# Patient Record
Sex: FEMALE | Race: WHITE | Employment: UNEMPLOYED | URBAN - NONMETROPOLITAN AREA
[De-identification: names, ages, dates, MRNs, and addresses within clinical notes are randomized per-mention and may not be internally consistent; named-entity substitution may affect disease eponyms.]

---

## 2017-12-06 ENCOUNTER — OFFICE VISIT (OUTPATIENT)
Dept: FAMILY MEDICINE CLINIC | Age: 77
End: 2017-12-06
Payer: MEDICARE

## 2017-12-06 VITALS
SYSTOLIC BLOOD PRESSURE: 116 MMHG | HEART RATE: 73 BPM | OXYGEN SATURATION: 97 % | BODY MASS INDEX: 32.83 KG/M2 | HEIGHT: 67 IN | WEIGHT: 209.2 LBS | RESPIRATION RATE: 18 BRPM | DIASTOLIC BLOOD PRESSURE: 60 MMHG

## 2017-12-06 DIAGNOSIS — G47.00 INSOMNIA, UNSPECIFIED TYPE: ICD-10-CM

## 2017-12-06 DIAGNOSIS — G89.29 CHRONIC LEFT SHOULDER PAIN: Primary | ICD-10-CM

## 2017-12-06 DIAGNOSIS — M25.512 CHRONIC LEFT SHOULDER PAIN: Primary | ICD-10-CM

## 2017-12-06 DIAGNOSIS — M19.012 ARTHRITIS OF SHOULDER REGION, LEFT: ICD-10-CM

## 2017-12-06 DIAGNOSIS — E11.9 CONTROLLED TYPE 2 DIABETES MELLITUS WITHOUT COMPLICATION, WITHOUT LONG-TERM CURRENT USE OF INSULIN (HCC): ICD-10-CM

## 2017-12-06 DIAGNOSIS — I10 ESSENTIAL HYPERTENSION: ICD-10-CM

## 2017-12-06 DIAGNOSIS — Z91.81 AT RISK FOR FALLS: ICD-10-CM

## 2017-12-06 DIAGNOSIS — R53.81 PHYSICAL DECONDITIONING: ICD-10-CM

## 2017-12-06 DIAGNOSIS — Z78.0 POST-MENOPAUSAL: ICD-10-CM

## 2017-12-06 DIAGNOSIS — K21.9 GASTROESOPHAGEAL REFLUX DISEASE WITHOUT ESOPHAGITIS: ICD-10-CM

## 2017-12-06 PROCEDURE — G8484 FLU IMMUNIZE NO ADMIN: HCPCS | Performed by: NURSE PRACTITIONER

## 2017-12-06 PROCEDURE — G8400 PT W/DXA NO RESULTS DOC: HCPCS | Performed by: NURSE PRACTITIONER

## 2017-12-06 PROCEDURE — G8427 DOCREV CUR MEDS BY ELIG CLIN: HCPCS | Performed by: NURSE PRACTITIONER

## 2017-12-06 PROCEDURE — 1123F ACP DISCUSS/DSCN MKR DOCD: CPT | Performed by: NURSE PRACTITIONER

## 2017-12-06 PROCEDURE — 1036F TOBACCO NON-USER: CPT | Performed by: NURSE PRACTITIONER

## 2017-12-06 PROCEDURE — G8417 CALC BMI ABV UP PARAM F/U: HCPCS | Performed by: NURSE PRACTITIONER

## 2017-12-06 PROCEDURE — 1090F PRES/ABSN URINE INCON ASSESS: CPT | Performed by: NURSE PRACTITIONER

## 2017-12-06 PROCEDURE — 4040F PNEUMOC VAC/ADMIN/RCVD: CPT | Performed by: NURSE PRACTITIONER

## 2017-12-06 PROCEDURE — 99204 OFFICE O/P NEW MOD 45 MIN: CPT | Performed by: NURSE PRACTITIONER

## 2017-12-06 RX ORDER — PIOGLITAZONEHYDROCHLORIDE 15 MG/1
TABLET ORAL
Refills: 0 | Status: ON HOLD | COMMUNITY
Start: 2017-11-08 | End: 2017-12-21

## 2017-12-06 RX ORDER — QUETIAPINE FUMARATE 300 MG/1
TABLET, FILM COATED ORAL
Refills: 0 | Status: ON HOLD | COMMUNITY
Start: 2017-11-13 | End: 2017-12-21

## 2017-12-06 RX ORDER — OMEPRAZOLE 20 MG/1
CAPSULE, DELAYED RELEASE ORAL
Refills: 0 | Status: ON HOLD | COMMUNITY
Start: 2017-12-03 | End: 2017-12-21

## 2017-12-06 RX ORDER — METOPROLOL SUCCINATE 25 MG/1
TABLET, EXTENDED RELEASE ORAL
Refills: 0 | Status: ON HOLD | COMMUNITY
Start: 2017-11-01 | End: 2017-12-21

## 2017-12-06 RX ORDER — VENLAFAXINE HYDROCHLORIDE 225 MG/1
TABLET, EXTENDED RELEASE ORAL
Refills: 0 | Status: ON HOLD | COMMUNITY
Start: 2017-12-03 | End: 2017-12-21

## 2017-12-06 RX ORDER — HYDROXYUREA 500 MG/1
CAPSULE ORAL
Refills: 1 | Status: ON HOLD | COMMUNITY
Start: 2017-11-08 | End: 2017-12-21

## 2017-12-06 ASSESSMENT — PATIENT HEALTH QUESTIONNAIRE - PHQ9
1. LITTLE INTEREST OR PLEASURE IN DOING THINGS: 0
SUM OF ALL RESPONSES TO PHQ QUESTIONS 1-9: 0
SUM OF ALL RESPONSES TO PHQ9 QUESTIONS 1 & 2: 0
2. FEELING DOWN, DEPRESSED OR HOPELESS: 0

## 2017-12-06 NOTE — PROGRESS NOTES
Negative for congestion, ear pain and rhinorrhea. Eyes: Negative for discharge and visual disturbance. Respiratory: Negative for cough, chest tightness and shortness of breath. Cardiovascular: Negative for chest pain and palpitations. Gastrointestinal: Negative for abdominal pain, constipation, diarrhea and vomiting. Genitourinary: Negative for difficulty urinating and hematuria. Musculoskeletal: Positive for arthralgias (shoulder). Negative for myalgias. Skin: Negative for rash. Neurological: Negative for dizziness, weakness, numbness and headaches. Psychiatric/Behavioral: Positive for sleep disturbance. Negative for suicidal ideas. The patient is not nervous/anxious. Objective:     /60   Pulse 73   Resp 18   Ht 5' 7\" (1.702 m)   Wt 209 lb 3.2 oz (94.9 kg)   SpO2 97%   BMI 32.77 kg/m²     Physical Exam   Constitutional: She is oriented to person, place, and time. She appears well-developed and well-nourished. HENT:   Head: Normocephalic and atraumatic. Right Ear: External ear normal.   Left Ear: External ear normal.   Eyes: Conjunctivae and EOM are normal.   Neck: Trachea normal and normal range of motion. Neck supple. No spinous process tenderness present. No thyromegaly present. Cardiovascular: Normal rate, regular rhythm and normal heart sounds. Exam reveals no gallop and no friction rub. No murmur heard. Pulmonary/Chest: Effort normal and breath sounds normal.   Abdominal: Soft. Bowel sounds are normal. There is no tenderness. Musculoskeletal: Normal range of motion. Left shoulder: She exhibits tenderness, pain and spasm. She exhibits normal range of motion (pain with ROM), no effusion, normal pulse and normal strength. Neurological: She is alert and oriented to person, place, and time. Skin: Skin is warm and dry. No rash noted. No erythema. Psychiatric: She has a normal mood and affect.  Her speech is normal and behavior is normal. Thought content normal.   Vitals reviewed. Assessment/Plan:     Alyse was seen today for shoulder pain. Diagnoses and all orders for this visit:    Chronic left shoulder pain  -     [facility] Dexa Bone Density Scan; Future  -     XR SHOULDER LEFT (MIN 2 VIEWS); Future  -     External Referral To Home Health    Gastroesophageal reflux disease without esophagitis  -     External Referral To Home Health    Physical deconditioning  -     Cancel: Shira Mohan 54  -     External Referral To Home Health    Controlled type 2 diabetes mellitus without complication, without long-term current use of insulin ([facility] [address]  -     External Referral To Home Health    Arthritis of shoulder region, left  -     External Referral To Home Health    Post-menopausal  -     [facility] Dexa Bone Density Scan; Future    At risk for falls  -     External Referral To Home Health    Essential hypertension  -     External Referral To Home Health    Insomnia, unspecified type  -     External Referral To Home Health        Return in about 2 months (around 2/6/2018) for Medicare wellness, schedule for am.      Discussed use, benefit, and side effects of prescribed medications. Barriers to medication compliance addressed. All patient questions answered. Pt voiced understanding.      Electronically signed by Mone Villasenor CNP on 12/13/2017 at 12:23 PM

## 2017-12-11 ENCOUNTER — TELEPHONE (OUTPATIENT)
Dept: FAMILY MEDICINE CLINIC | Age: 77
End: 2017-12-11

## 2017-12-13 ASSESSMENT — ENCOUNTER SYMPTOMS
CHEST TIGHTNESS: 0
DIARRHEA: 0
VOMITING: 0
CONSTIPATION: 0
COUGH: 0
ABDOMINAL PAIN: 0
RHINORRHEA: 0
SHORTNESS OF BREATH: 0
EYE DISCHARGE: 0

## 2017-12-18 ENCOUNTER — HOSPITAL ENCOUNTER (INPATIENT)
Age: 77
LOS: 3 days | Discharge: NURSING, REHAB OR LONG-TERM CARE | DRG: 544 | End: 2017-12-21
Attending: EMERGENCY MEDICINE | Admitting: INTERNAL MEDICINE
Payer: MEDICARE

## 2017-12-18 ENCOUNTER — APPOINTMENT (OUTPATIENT)
Dept: CT IMAGING | Age: 77
DRG: 544 | End: 2017-12-18
Payer: MEDICARE

## 2017-12-18 DIAGNOSIS — D64.9 ANEMIA, UNSPECIFIED TYPE: ICD-10-CM

## 2017-12-18 DIAGNOSIS — M48.54XA COMPRESSION FRACTURE OF THORACIC SPINE, NON-TRAUMATIC, INITIAL ENCOUNTER (HCC): ICD-10-CM

## 2017-12-18 DIAGNOSIS — E86.0 DEHYDRATION: ICD-10-CM

## 2017-12-18 DIAGNOSIS — R53.1 GENERAL WEAKNESS: ICD-10-CM

## 2017-12-18 DIAGNOSIS — S22.080A T12 COMPRESSION FRACTURE (HCC): Primary | ICD-10-CM

## 2017-12-18 LAB
ABO: NORMAL
ALBUMIN SERPL-MCNC: 3.6 G/DL (ref 3.5–5.1)
ALP BLD-CCNC: 92 U/L (ref 38–126)
ALT SERPL-CCNC: 65 U/L (ref 11–66)
AMORPHOUS: ABNORMAL
AMPHETAMINE+METHAMPHETAMINE URINE SCREEN: NEGATIVE
ANION GAP SERPL CALCULATED.3IONS-SCNC: 16 MEQ/L (ref 8–16)
ANISOCYTOSIS: ABNORMAL
ANTIBODY SCREEN: NORMAL
APTT: 27.1 SECONDS (ref 22–38)
AST SERPL-CCNC: 43 U/L (ref 5–40)
BACTERIA: ABNORMAL /HPF
BARBITURATE QUANTITATIVE URINE: NEGATIVE
BASOPHILS # BLD: 0.5 %
BASOPHILS ABSOLUTE: 0 THOU/MM3 (ref 0–0.1)
BENZODIAZEPINE QUANTITATIVE URINE: NEGATIVE
BILIRUB SERPL-MCNC: 0.5 MG/DL (ref 0.3–1.2)
BILIRUBIN URINE: ABNORMAL
BLOOD, URINE: NEGATIVE
BUN BLDV-MCNC: 53 MG/DL (ref 7–22)
CALCIUM SERPL-MCNC: 9.1 MG/DL (ref 8.5–10.5)
CANNABINOID QUANTITATIVE URINE: NEGATIVE
CASTS 2: ABNORMAL /LPF
CASTS UA: ABNORMAL /LPF
CHARACTER, URINE: ABNORMAL
CHLORIDE BLD-SCNC: 97 MEQ/L (ref 98–111)
CO2: 25 MEQ/L (ref 23–33)
COCAINE METABOLITE QUANTITATIVE URINE: NEGATIVE
COLOR: YELLOW
CREAT SERPL-MCNC: 1.3 MG/DL (ref 0.4–1.2)
CRYSTALS, UA: ABNORMAL
EKG ATRIAL RATE: 84 BPM
EKG P AXIS: 73 DEGREES
EKG P-R INTERVAL: 198 MS
EKG Q-T INTERVAL: 384 MS
EKG QRS DURATION: 86 MS
EKG QTC CALCULATION (BAZETT): 453 MS
EKG R AXIS: 26 DEGREES
EKG T AXIS: 67 DEGREES
EKG VENTRICULAR RATE: 84 BPM
EOSINOPHIL # BLD: 0.3 %
EOSINOPHILS ABSOLUTE: 0 THOU/MM3 (ref 0–0.4)
EPITHELIAL CELLS, UA: ABNORMAL /HPF
FERRITIN: 1372 NG/ML (ref 10–291)
GFR SERPL CREATININE-BSD FRML MDRD: 39 ML/MIN/1.73M2
GLUCOSE BLD-MCNC: 108 MG/DL (ref 70–108)
GLUCOSE URINE: NEGATIVE MG/DL
HCT VFR BLD CALC: 27.6 % (ref 37–47)
HEMOCCULT STL QL: NEGATIVE
HEMOGLOBIN: 9 GM/DL (ref 12–16)
ICTOTEST: NEGATIVE
INR BLD: 1.07 (ref 0.85–1.13)
IRON: 61 UG/DL (ref 50–170)
KETONES, URINE: ABNORMAL
LACTIC ACID: 1.2 MMOL/L (ref 0.5–2.2)
LEUKOCYTE ESTERASE, URINE: NEGATIVE
LIPASE: 41.1 U/L (ref 5.6–51.3)
LYMPHOCYTES # BLD: 13.3 %
LYMPHOCYTES ABSOLUTE: 0.5 THOU/MM3 (ref 1–4.8)
MACROCYTES: ABNORMAL
MCH RBC QN AUTO: 45.8 PG (ref 27–31)
MCHC RBC AUTO-ENTMCNC: 32.6 GM/DL (ref 33–37)
MCV RBC AUTO: 140.3 FL (ref 81–99)
MISCELLANEOUS LAB TEST RESULT: ABNORMAL
MONOCYTES # BLD: 11.2 %
MONOCYTES ABSOLUTE: 0.4 THOU/MM3 (ref 0.4–1.3)
NITRITE, URINE: NEGATIVE
NUCLEATED RED BLOOD CELLS: 0 /100 WBC
OPIATES, URINE: NEGATIVE
OSMOLALITY CALCULATION: 290.6 MOSMOL/KG (ref 275–300)
OXYCODONE: NEGATIVE
PATHOLOGIST REVIEW: ABNORMAL
PDW BLD-RTO: 21.1 % (ref 11.5–14.5)
PH UA: 5
PHENCYCLIDINE QUANTITATIVE URINE: POSITIVE
PLATELET # BLD: 698 THOU/MM3 (ref 130–400)
PLATELET ESTIMATE: ABNORMAL
PMV BLD AUTO: 7.7 MCM (ref 7.4–10.4)
POTASSIUM SERPL-SCNC: 3.5 MEQ/L (ref 3.5–5.2)
PRO-BNP: 378.1 PG/ML (ref 0–1800)
PROTEIN UA: 30
RBC # BLD: 1.96 MILL/MM3 (ref 4.2–5.4)
RBC URINE: ABNORMAL /HPF
RENAL EPITHELIAL, UA: ABNORMAL
RH FACTOR: NORMAL
SCAN OF BLOOD SMEAR: NORMAL
SEG NEUTROPHILS: 74.7 %
SEGMENTED NEUTROPHILS ABSOLUTE COUNT: 2.6 THOU/MM3 (ref 1.8–7.7)
SODIUM BLD-SCNC: 138 MEQ/L (ref 135–145)
SPECIFIC GRAVITY, URINE: >= 1.03 (ref 1–1.03)
TOTAL CK: 83 U/L (ref 30–135)
TOTAL IRON BINDING CAPACITY: 179 UG/DL (ref 171–450)
TOTAL PROTEIN: 6.6 G/DL (ref 6.1–8)
TROPONIN T: < 0.01 NG/ML
UROBILINOGEN, URINE: 1 EU/DL
WBC # BLD: 3.5 THOU/MM3 (ref 4.8–10.8)
WBC UA: ABNORMAL /HPF
YEAST: ABNORMAL

## 2017-12-18 PROCEDURE — 6360000002 HC RX W HCPCS: Performed by: EMERGENCY MEDICINE

## 2017-12-18 PROCEDURE — 6360000002 HC RX W HCPCS: Performed by: INTERNAL MEDICINE

## 2017-12-18 PROCEDURE — 82728 ASSAY OF FERRITIN: CPT

## 2017-12-18 PROCEDURE — 85025 COMPLETE CBC W/AUTO DIFF WBC: CPT

## 2017-12-18 PROCEDURE — 99239 HOSP IP/OBS DSCHRG MGMT >30: CPT | Performed by: INTERNAL MEDICINE

## 2017-12-18 PROCEDURE — 81001 URINALYSIS AUTO W/SCOPE: CPT

## 2017-12-18 PROCEDURE — 83880 ASSAY OF NATRIURETIC PEPTIDE: CPT

## 2017-12-18 PROCEDURE — 99285 EMERGENCY DEPT VISIT HI MDM: CPT

## 2017-12-18 PROCEDURE — 85610 PROTHROMBIN TIME: CPT

## 2017-12-18 PROCEDURE — 96374 THER/PROPH/DIAG INJ IV PUSH: CPT

## 2017-12-18 PROCEDURE — 80307 DRUG TEST PRSMV CHEM ANLYZR: CPT

## 2017-12-18 PROCEDURE — 83605 ASSAY OF LACTIC ACID: CPT

## 2017-12-18 PROCEDURE — 83550 IRON BINDING TEST: CPT

## 2017-12-18 PROCEDURE — 93005 ELECTROCARDIOGRAM TRACING: CPT

## 2017-12-18 PROCEDURE — 82550 ASSAY OF CK (CPK): CPT

## 2017-12-18 PROCEDURE — 85730 THROMBOPLASTIN TIME PARTIAL: CPT

## 2017-12-18 PROCEDURE — 36415 COLL VENOUS BLD VENIPUNCTURE: CPT

## 2017-12-18 PROCEDURE — 83690 ASSAY OF LIPASE: CPT

## 2017-12-18 PROCEDURE — 1200000000 HC SEMI PRIVATE

## 2017-12-18 PROCEDURE — 96361 HYDRATE IV INFUSION ADD-ON: CPT

## 2017-12-18 PROCEDURE — 2580000003 HC RX 258: Performed by: EMERGENCY MEDICINE

## 2017-12-18 PROCEDURE — 74176 CT ABD & PELVIS W/O CONTRAST: CPT

## 2017-12-18 PROCEDURE — 6370000000 HC RX 637 (ALT 250 FOR IP): Performed by: INTERNAL MEDICINE

## 2017-12-18 PROCEDURE — 82272 OCCULT BLD FECES 1-3 TESTS: CPT

## 2017-12-18 PROCEDURE — 86901 BLOOD TYPING SEROLOGIC RH(D): CPT

## 2017-12-18 PROCEDURE — 80053 COMPREHEN METABOLIC PANEL: CPT

## 2017-12-18 PROCEDURE — 83540 ASSAY OF IRON: CPT

## 2017-12-18 PROCEDURE — 2580000003 HC RX 258: Performed by: INTERNAL MEDICINE

## 2017-12-18 PROCEDURE — 86850 RBC ANTIBODY SCREEN: CPT

## 2017-12-18 PROCEDURE — 86900 BLOOD TYPING SEROLOGIC ABO: CPT

## 2017-12-18 PROCEDURE — 84484 ASSAY OF TROPONIN QUANT: CPT

## 2017-12-18 PROCEDURE — 76376 3D RENDER W/INTRP POSTPROCES: CPT

## 2017-12-18 RX ORDER — OXYCODONE HYDROCHLORIDE AND ACETAMINOPHEN 5; 325 MG/1; MG/1
1 TABLET ORAL EVERY 4 HOURS PRN
Status: DISCONTINUED | OUTPATIENT
Start: 2017-12-18 | End: 2017-12-21 | Stop reason: HOSPADM

## 2017-12-18 RX ORDER — ACETAMINOPHEN 325 MG/1
650 TABLET ORAL EVERY 4 HOURS PRN
Status: DISCONTINUED | OUTPATIENT
Start: 2017-12-18 | End: 2017-12-21 | Stop reason: HOSPADM

## 2017-12-18 RX ORDER — SODIUM CHLORIDE 9 MG/ML
INJECTION, SOLUTION INTRAVENOUS CONTINUOUS
Status: DISCONTINUED | OUTPATIENT
Start: 2017-12-18 | End: 2017-12-19

## 2017-12-18 RX ORDER — SODIUM CHLORIDE 0.9 % (FLUSH) 0.9 %
10 SYRINGE (ML) INJECTION EVERY 12 HOURS SCHEDULED
Status: DISCONTINUED | OUTPATIENT
Start: 2017-12-18 | End: 2017-12-21 | Stop reason: HOSPADM

## 2017-12-18 RX ORDER — HYDROXYUREA 500 MG/1
500 CAPSULE ORAL 3 TIMES DAILY
Status: DISCONTINUED | OUTPATIENT
Start: 2017-12-18 | End: 2017-12-21 | Stop reason: HOSPADM

## 2017-12-18 RX ORDER — LIDOCAINE 50 MG/G
1 PATCH TOPICAL DAILY
Status: DISCONTINUED | OUTPATIENT
Start: 2017-12-18 | End: 2017-12-21 | Stop reason: HOSPADM

## 2017-12-18 RX ORDER — FERROUS SULFATE 325(65) MG
325 TABLET ORAL
Status: DISCONTINUED | OUTPATIENT
Start: 2017-12-18 | End: 2017-12-21 | Stop reason: HOSPADM

## 2017-12-18 RX ORDER — POLYETHYLENE GLYCOL 3350, SODIUM CHLORIDE, SODIUM BICARBONATE, POTASSIUM CHLORIDE 420; 11.2; 5.72; 1.48 G/4L; G/4L; G/4L; G/4L
4000 POWDER, FOR SOLUTION ORAL ONCE
Status: DISCONTINUED | OUTPATIENT
Start: 2017-12-18 | End: 2017-12-21 | Stop reason: HOSPADM

## 2017-12-18 RX ORDER — VENLAFAXINE HYDROCHLORIDE 75 MG/1
225 CAPSULE, EXTENDED RELEASE ORAL
Status: DISCONTINUED | OUTPATIENT
Start: 2017-12-18 | End: 2017-12-21 | Stop reason: HOSPADM

## 2017-12-18 RX ORDER — ATORVASTATIN CALCIUM 20 MG/1
20 TABLET, FILM COATED ORAL DAILY
Status: DISCONTINUED | OUTPATIENT
Start: 2017-12-18 | End: 2017-12-21 | Stop reason: HOSPADM

## 2017-12-18 RX ORDER — FAMOTIDINE 20 MG/1
20 TABLET, FILM COATED ORAL 2 TIMES DAILY
Status: DISCONTINUED | OUTPATIENT
Start: 2017-12-18 | End: 2017-12-18 | Stop reason: DRUGHIGH

## 2017-12-18 RX ORDER — HYDROXYUREA 500 MG/1
500 CAPSULE ORAL DAILY
Status: DISCONTINUED | OUTPATIENT
Start: 2017-12-18 | End: 2017-12-18 | Stop reason: DRUGHIGH

## 2017-12-18 RX ORDER — CYANOCOBALAMIN 1000 UG/ML
1000 INJECTION INTRAMUSCULAR; SUBCUTANEOUS
Status: DISCONTINUED | OUTPATIENT
Start: 2018-01-12 | End: 2017-12-21 | Stop reason: HOSPADM

## 2017-12-18 RX ORDER — FAMOTIDINE 20 MG/1
20 TABLET, FILM COATED ORAL DAILY
Status: DISCONTINUED | OUTPATIENT
Start: 2017-12-18 | End: 2017-12-21 | Stop reason: HOSPADM

## 2017-12-18 RX ORDER — SODIUM CHLORIDE 0.9 % (FLUSH) 0.9 %
10 SYRINGE (ML) INJECTION PRN
Status: DISCONTINUED | OUTPATIENT
Start: 2017-12-18 | End: 2017-12-21 | Stop reason: HOSPADM

## 2017-12-18 RX ORDER — ACETAMINOPHEN 500 MG
1000 TABLET ORAL EVERY 6 HOURS PRN
Status: DISCONTINUED | OUTPATIENT
Start: 2017-12-18 | End: 2017-12-21 | Stop reason: HOSPADM

## 2017-12-18 RX ORDER — VALSARTAN 160 MG/1
160 TABLET ORAL DAILY
Status: DISCONTINUED | OUTPATIENT
Start: 2017-12-18 | End: 2017-12-21 | Stop reason: HOSPADM

## 2017-12-18 RX ORDER — POTASSIUM CHLORIDE 20MEQ/15ML
40 LIQUID (ML) ORAL PRN
Status: DISCONTINUED | OUTPATIENT
Start: 2017-12-18 | End: 2017-12-21 | Stop reason: HOSPADM

## 2017-12-18 RX ORDER — POTASSIUM CHLORIDE 7.45 MG/ML
10 INJECTION INTRAVENOUS PRN
Status: DISCONTINUED | OUTPATIENT
Start: 2017-12-18 | End: 2017-12-21 | Stop reason: HOSPADM

## 2017-12-18 RX ORDER — MORPHINE SULFATE 2 MG/ML
2 INJECTION, SOLUTION INTRAMUSCULAR; INTRAVENOUS ONCE
Status: COMPLETED | OUTPATIENT
Start: 2017-12-18 | End: 2017-12-18

## 2017-12-18 RX ORDER — PANTOPRAZOLE SODIUM 40 MG/1
40 TABLET, DELAYED RELEASE ORAL
Status: DISCONTINUED | OUTPATIENT
Start: 2017-12-19 | End: 2017-12-21 | Stop reason: HOSPADM

## 2017-12-18 RX ORDER — ONDANSETRON 2 MG/ML
4 INJECTION INTRAMUSCULAR; INTRAVENOUS EVERY 6 HOURS PRN
Status: DISCONTINUED | OUTPATIENT
Start: 2017-12-18 | End: 2017-12-21 | Stop reason: HOSPADM

## 2017-12-18 RX ORDER — ASPIRIN 81 MG/1
81 TABLET ORAL DAILY
Status: DISCONTINUED | OUTPATIENT
Start: 2017-12-18 | End: 2017-12-20

## 2017-12-18 RX ORDER — HYDROCHLOROTHIAZIDE 25 MG/1
25 TABLET ORAL DAILY
Status: DISCONTINUED | OUTPATIENT
Start: 2017-12-18 | End: 2017-12-21 | Stop reason: HOSPADM

## 2017-12-18 RX ORDER — POTASSIUM CHLORIDE 20 MEQ/1
40 TABLET, EXTENDED RELEASE ORAL PRN
Status: DISCONTINUED | OUTPATIENT
Start: 2017-12-18 | End: 2017-12-21 | Stop reason: HOSPADM

## 2017-12-18 RX ORDER — OXYBUTYNIN CHLORIDE 5 MG/1
5 TABLET, EXTENDED RELEASE ORAL DAILY
Status: DISCONTINUED | OUTPATIENT
Start: 2017-12-18 | End: 2017-12-21 | Stop reason: HOSPADM

## 2017-12-18 RX ORDER — PIOGLITAZONEHYDROCHLORIDE 15 MG/1
15 TABLET ORAL DAILY
Status: DISCONTINUED | OUTPATIENT
Start: 2017-12-18 | End: 2017-12-21 | Stop reason: HOSPADM

## 2017-12-18 RX ORDER — DEXTROSE AND SODIUM CHLORIDE 5; .33 G/100ML; G/100ML
INJECTION, SOLUTION INTRAVENOUS CONTINUOUS
Status: DISCONTINUED | OUTPATIENT
Start: 2017-12-18 | End: 2017-12-19

## 2017-12-18 RX ORDER — OXCARBAZEPINE 300 MG/1
300 TABLET, FILM COATED ORAL 2 TIMES DAILY
Status: DISCONTINUED | OUTPATIENT
Start: 2017-12-18 | End: 2017-12-21 | Stop reason: HOSPADM

## 2017-12-18 RX ORDER — METOPROLOL SUCCINATE 25 MG/1
25 TABLET, EXTENDED RELEASE ORAL DAILY
Status: DISCONTINUED | OUTPATIENT
Start: 2017-12-18 | End: 2017-12-21 | Stop reason: HOSPADM

## 2017-12-18 RX ORDER — QUETIAPINE FUMARATE 100 MG/1
300 TABLET, FILM COATED ORAL NIGHTLY
Status: DISCONTINUED | OUTPATIENT
Start: 2017-12-18 | End: 2017-12-21 | Stop reason: HOSPADM

## 2017-12-18 RX ADMIN — SODIUM CHLORIDE: 9 INJECTION, SOLUTION INTRAVENOUS at 15:38

## 2017-12-18 RX ADMIN — QUETIAPINE FUMARATE 300 MG: 100 TABLET ORAL at 20:35

## 2017-12-18 RX ADMIN — HYDROXYUREA 500 MG: 500 CAPSULE ORAL at 20:35

## 2017-12-18 RX ADMIN — OXYCODONE HYDROCHLORIDE AND ACETAMINOPHEN 1 TABLET: 5; 325 TABLET ORAL at 20:35

## 2017-12-18 RX ADMIN — ENOXAPARIN SODIUM 40 MG: 40 INJECTION SUBCUTANEOUS at 15:35

## 2017-12-18 RX ADMIN — OXYCODONE HYDROCHLORIDE AND ACETAMINOPHEN 1 TABLET: 5; 325 TABLET ORAL at 16:20

## 2017-12-18 RX ADMIN — ATORVASTATIN CALCIUM 20 MG: 20 TABLET, FILM COATED ORAL at 20:35

## 2017-12-18 RX ADMIN — MORPHINE SULFATE 2 MG: 2 INJECTION, SOLUTION INTRAMUSCULAR; INTRAVENOUS at 07:35

## 2017-12-18 RX ADMIN — OXCARBAZEPINE 300 MG: 300 TABLET ORAL at 20:35

## 2017-12-18 RX ADMIN — DEXTROSE AND SODIUM CHLORIDE: 5; 330 INJECTION, SOLUTION INTRAVENOUS at 07:36

## 2017-12-18 ASSESSMENT — PAIN DESCRIPTION - ORIENTATION
ORIENTATION: RIGHT
ORIENTATION: RIGHT
ORIENTATION: LOWER
ORIENTATION: LOWER;RIGHT

## 2017-12-18 ASSESSMENT — PAIN DESCRIPTION - PAIN TYPE
TYPE: ACUTE PAIN

## 2017-12-18 ASSESSMENT — PAIN SCALES - GENERAL
PAINLEVEL_OUTOF10: 5
PAINLEVEL_OUTOF10: 7
PAINLEVEL_OUTOF10: 6
PAINLEVEL_OUTOF10: 6
PAINLEVEL_OUTOF10: 7
PAINLEVEL_OUTOF10: 8
PAINLEVEL_OUTOF10: 7

## 2017-12-18 ASSESSMENT — PAIN DESCRIPTION - DESCRIPTORS
DESCRIPTORS: DISCOMFORT
DESCRIPTORS: DISCOMFORT
DESCRIPTORS: ACHING

## 2017-12-18 ASSESSMENT — PAIN DESCRIPTION - LOCATION
LOCATION: BACK
LOCATION: BACK;ABDOMEN
LOCATION: BACK
LOCATION: BACK

## 2017-12-18 ASSESSMENT — ENCOUNTER SYMPTOMS
EYE ITCHING: 0
EYE DISCHARGE: 0
DIARRHEA: 0
VOMITING: 0
SHORTNESS OF BREATH: 0
COUGH: 0
NAUSEA: 0
WHEEZING: 0
RHINORRHEA: 0
ABDOMINAL PAIN: 0
BACK PAIN: 1
ABDOMINAL DISTENTION: 0

## 2017-12-18 ASSESSMENT — PAIN DESCRIPTION - FREQUENCY
FREQUENCY: CONTINUOUS

## 2017-12-18 ASSESSMENT — PAIN DESCRIPTION - PROGRESSION
CLINICAL_PROGRESSION: NOT CHANGED
CLINICAL_PROGRESSION: GRADUALLY WORSENING

## 2017-12-18 ASSESSMENT — PAIN DESCRIPTION - ONSET
ONSET: ON-GOING
ONSET: ON-GOING

## 2017-12-18 NOTE — ED NOTES
Spoke with pts daughter and updated on admission plans. Pts daughter is en route to ED at this time.       Natasha Lane, [address] [facility]  12/18/17 8429

## 2017-12-18 NOTE — ED NOTES
Pt admitted to hospital. Pt to be transported to floor at 1010.         Severo Bickers List, LPN  43/39/34 [id number]

## 2017-12-18 NOTE — ED TRIAGE NOTES
Pt presents to ED with c/o lower back pain and weakness. Pt states she fell last week and was seen at [facility] and was discharged. Pt states since then she has been experiencing Rt lower back pain. Pt states pain is 8/10, continuous pain, that worsens with ambulation. Pt states since her fall she is experiencing generalized weakness. Dr Scar Zazueta at bedside. Pt states she has had a decreased appetite since the fall as well, which she believes is why she is weak.

## 2017-12-18 NOTE — CARE COORDINATION
12/18/17, 3:12 PM      Alyse Jimenez       Admitted from: ED 12/18/2017/ Timothyu 23 day: 0   Location: -/[facility] Reason for admit: Compression fracture of thoracic spine, non-traumatic, initial encounter [facility]) [M48.54XA]  Compression fracture of thoracic spine, non-traumatic, initial encounter [facility]) [I24.11FJ] Status: inpatient  Admit order signed?: yes  PMH:  has a past medical history of Arthritis; Cancer ([facility] [address] Diabetes mellitus ([facility] [address] Hyperlipidemia; Hypertension; and Psychiatric problem. Procedure: 12/18/17 Kyphoplasty planned for T12 compression fracture  Pertinent abnormal Imaging:t12 compression fx  Medications:  Scheduled Meds:   aspirin  81 mg Oral Daily    atorvastatin  20 mg Oral Daily    cyanocobalamin  1,000 mcg Intramuscular Q30 Days    ferrous sulfate  325 mg Oral TID WC    hydrochlorothiazide  25 mg Oral Daily    hydroxyurea  500 mg Oral Daily    metoprolol succinate  25 mg Oral Daily    mupirocin   Topical BID    [START ON 12/19/2017] pantoprazole  40 mg Oral QAM AC    OXcarbazepine  300 mg Oral BID    oxybutynin  5 mg Oral Daily    pioglitazone  15 mg Oral Daily    QUEtiapine  300 mg Oral Nightly    valsartan  160 mg Oral Daily    venlafaxine  225 mg Oral Daily with breakfast    sodium chloride flush  10 mL Intravenous 2 times per day    enoxaparin  40 mg Subcutaneous Q24H    polyethylene glycol-electrolytes  4,000 mL Oral Once    famotidine  20 mg Oral Daily     Continuous Infusions:   dextrose 5 % and 0.33 % NaCl 125 mL/hr at 12/18/17 0736    sodium chloride        Pertinent Info/Orders/Treatment Plan:  N/V checks. Pain management. Kyphoplasty planned  Diet: DIET LOW SODIUM 2 GM;   DVT Prophylaxis: Lovenox  Smoking status:  reports that she has never smoked.  She has never used smokeless tobacco.   Influenza Vaccination Screening Completed: yes  Pneumonia Vaccination Screening Completed: yes  Core measures: vte  PCP: Tyrel Escobar CNP  Readmission:   no  Risk Score: 9.5     Discharge Planning  Current Residence:  Private Residence  Living Arrangements:  Children   Support Systems:  Children  Current Services PTA:     Potential Assistance Needed:  Sarath Baca, Outpatient PT/OT  Potential Assistance Purchasing Medications:  No  Does patient want to participate in local refill/ meds to beds program?  No  Type of Home Care Services:  Nursing Services  Patient expects to be discharged to:  home vs Rehab  Expected Discharge date:  12/22/17  Follow Up Appointment: Best Day/ Time: Monday AM    Discharge Plan: Keith Bagley thinks she may need HH vs ECF. Jan Vuong evaluated and will follow progress to help determine appropriate next level of care.    Evaluation: yes

## 2017-12-18 NOTE — CARE COORDINATION
DISCHARGE BARRIERS  12/18/17, 1:27 PM    Reason for Referral: ECF? Mental Status: Patient is alert and oriented  Decision Making: Patient is making her own decisions at this time  Family/Social/Home Environment: Assessment completed with Patient and daughter. Patient resides alone in low-income apartments with no stairs. Patient stated that she was independent and driving prior to falling. Patient stated that she did go to [facility] was discharged home and then started to not be able to walk and was in pain and dizzy when she would close her eyes. Patient stated that she knows she will more than likely have to go somewhere but does not know where at this time. All of her family resides in [place] and they are attempting to talk her into relocating. Current Services: none  Current Equipment: walker, cane  Payment Source: Medicare, Medicaid  Concerns or Barriers to Discharge: not at this time  Collabrative List of ECF/HH were provided: offered and declined. Teach Back Method used with Patient regarding care plan and discharge plan. Patient and daughter verbalize understanding of the plan of care and contribute to goal setting. Anticipated Needs/Discharge Plan: [facility] vs ECF in [place] vs home with home health.      Electronically signed by SLOANE Sutherland, JOSE E on 12/18/2017 at 1:27 PM

## 2017-12-18 NOTE — ED PROVIDER NOTES
Process   Final Result   AGE-INDETERMINATE, LIKELY ACUTE/SUBACUTE, COMPRESSION FRACTURE OF THE T12 VERTEBRAL BODY WITH RETROPULSED COMPONENTS EXTENDING INTO THE CANAL TO PRODUCE MILD/MODERATE CENTRAL CANAL STENOSIS. NO EVIDENCE OF UNSTABLE FRACTURE. CHARACTERISTICS    OF LIKELY PATHOLOGIC OSTEOPOROTIC COMPRESSION FRACTURE. CORRELATION WITH POINT TENDERNESS RECOMMENDED. POTENTIALLY THIS PATIENT COULD BENEFIT FROM VERTEBROPLASTY FOR PAIN MANAGEMENT. IF THIS IS A POSSIBLE TREATMENT OPTION, CONSULTATION WITH    INTERVENTIONAL RADIOLOGY SCHEDULING ([phone]) CAN ASSIST WITH THIS. MULTILEVEL DEGENERATIVE CHANGES RESULTING IN MULTIPLE AREAS OF LIKELY CLINICALLY SIGNIFICANT CANAL AND FORAMINAL STENOSIS, AS DISCUSSED. **This report has been created using voice recognition software. It may contain minor errors which are inherent in voice recognition technology. **            Final report electronically signed by Dr. Mejia Chavarria on 12/18/2017 8:54 AM      CT ABDOMEN PELVIS WO IV CONTRAST Additional Contrast? None   Final Result   AGE-INDETERMINATE COMPRESSION FRACTURE OF THE T12 VERTEBRAL BODY WITH RETROPULSED COMPONENTS PRODUCING MILD/MODERATE CENTRAL CANAL STENOSIS AT T12. POSSIBLE CONSTIPATION. NO OTHER EVIDENCE OF POSSIBLE ACUTE ABNORMALITY. INCIDENTAL FINDINGS AS DISCUSSED. **This report has been created using voice recognition software. It may contain minor errors which are inherent in voice recognition technology. **            Final report electronically signed by Dr. Mejia Chavarria on 12/18/2017 8:47 AM          [] Visualized and interpreted by me   [] Radiologist's Wet Read Report Reviewed   [] Discussed with Radiologist.    LABS:   Labs Reviewed   CBC WITH AUTO DIFFERENTIAL - Abnormal; Notable for the following:        Result Value    WBC 3.5 (*)     RBC 1.96 (*)     Hemoglobin 9.0 (*)     Hematocrit 27.6 (*)     .3 (*)     MCH 45.8 (*)     MCHC 32.6 (*) RDW 21.1 (*)     Platelets 172 (*)     All other components within normal limits   COMPREHENSIVE METABOLIC PANEL - Abnormal; Notable for the following:     CREATININE 1.3 (*)     BUN 53 (*)     Chloride 97 (*)     AST 43 (*)     All other components within normal limits   GLOMERULAR FILTRATION RATE, ESTIMATED - Abnormal; Notable for the following:     Est, Glom Filt Rate 39 (*)     All other components within normal limits   URINE WITH REFLEXED MICRO - Abnormal; Notable for the following:     Bilirubin Urine MODERATE (*)     Ketones, Urine TRACE (*)     Protein, UA 30 (*)     All other components within normal limits   LIPASE   TROPONIN   BRAIN NATRIURETIC PEPTIDE   URINE DRUG SCREEN   CK   LACTIC ACID, PLASMA   APTT   PROTIME-INR   BLOOD OCCULT STOOL SCREEN #1   ANION GAP   OSMOLALITY   BILE ACIDS, TOTAL   SCAN OF BLOOD SMEAR   FERRITIN   IRON   IRON BINDING CAPACITY   TYPE AND SCREEN       EMERGENCY DEPARTMENT COURSE:   Vitals:    Vitals:    12/18/17 0715 12/18/17 0809   BP: 115/77 116/66   Pulse: 91 84   Resp: 18 16   Temp: 98 °F (36.7 °C)    TempSrc: Oral    SpO2: 99% 98%   Weight: 200 lb (90.7 kg)    Height: 5' 7\" (1.702 m)      7:27 AM    Patient is seen and evaluated in a timely fashion. She has no chest pain. She has right lower back pain as well as right pelvic pain. Her EKG shows subtle ST elevation from inferiorly leads however without reciprocal changes. There is no old EKG to compare. Given that patient has no prior cardiac history, no chest pain, I would not call this is a STEMI. I will repeat EKG 30 minutes. 9:29 AM    Labs reviewed. Hemoglobin 9. Patient denied history of anemia. Stool guaiac negative. BUN 53. Cr 1.3. Patient has dehydration. She is already on D5NS IV fluid. Other labs are reassuring. CT shows age indetermined compression fracture of the T12 possible acute/subacute with mild/moderate central canal stenosis. Admission warrants.  Discussed and admitted by hospitalist

## 2017-12-18 NOTE — PROGRESS NOTES
Nutrition Assessment    Type and Reason for Visit: Initial, Positive Nutrition Screen (wt loss & decreased intake)    Nutrition Recommendations: Monitor renal status. Recommend regular wt checks. Malnutrition Assessment:  · Malnutrition Status: At risk for malnutrition  Nutrition Diagnosis:   · Problem: Inadequate oral intake  · Etiology: related to Insufficient energy/nutrient consumption     Signs and symptoms:  as evidenced by Patient report of    Nutrition Assessment:  · Subjective Assessment: Pt s/p fall  ~2 weeks ago admitted with compresssion fx & Dr notes dehydration. Pt seen at bedside & is somewhat difficult to follow, but reports decreased intake over last 2 weeks, just says no appetite, does not want nutrition drink & reports UBW ~202-209# 1 year ago. Pt reports no chewing or swallowing difficuties. Did discuss increased protein foods & pt smiles & keeps saying OK. RN addressing pain meds. BUN 53, Cr 1.3  · Wound Type: None (reported)  · Current Nutrition Therapies:  · Oral Diet Orders: 2gm Sodium   · Oral Diet intake: Unable to assess  · Oral Nutrition Supplement (ONS) Orders: None (pt declines)  · Anthropometric Measures:  · Ht: 5' 7\" (170.2 cm)   · Current Body Wt: 189 lb 6 oz (85.9 kg) (12/18)  · Admission Body Wt: 189 lb 6 oz (85.9 kg) (12/18)  · Usual Body Wt:  (per pt 202-209# last year)  · % Weight Change: 6% unplanned wt loss over 1 year,     · BMI Classification:  (29.7) overweight  · Comparative Standards (Estimated Nutrition Needs):  · Estimated Daily Total Kcal: ~1900 kcals  · Estimated Daily Protein (g): ~90 grams    Estimated Intake vs Estimated Needs: Intake Less Than Needs    Nutrition Risk Level:  Moderate    Nutrition Interventions:    (Diet per Dr)  Continued Inpatient Monitoring, Education Initiated (best effort healthy po including protein choices at all meals)    Nutrition Evaluation:   · Evaluation: Goals set   · Goals: 75% or more of meal intake during LOS

## 2017-12-18 NOTE — ED NOTES
Pt resting on cart, denies needs at this time. Call light in reach. Resp easy, unlabored.       [facility]  59/92/00 5028

## 2017-12-19 ENCOUNTER — APPOINTMENT (OUTPATIENT)
Dept: NUCLEAR MEDICINE | Age: 77
DRG: 544 | End: 2017-12-19
Payer: MEDICARE

## 2017-12-19 ENCOUNTER — APPOINTMENT (OUTPATIENT)
Dept: INTERVENTIONAL RADIOLOGY/VASCULAR | Age: 77
DRG: 544 | End: 2017-12-19
Payer: MEDICARE

## 2017-12-19 ENCOUNTER — APPOINTMENT (OUTPATIENT)
Dept: GENERAL RADIOLOGY | Age: 77
DRG: 544 | End: 2017-12-19
Payer: MEDICARE

## 2017-12-19 LAB
ANION GAP SERPL CALCULATED.3IONS-SCNC: 12 MEQ/L (ref 8–16)
BUN BLDV-MCNC: 53 MG/DL (ref 7–22)
CALCIUM SERPL-MCNC: 8.2 MG/DL (ref 8.5–10.5)
CHLORIDE BLD-SCNC: 100 MEQ/L (ref 98–111)
CO2: 27 MEQ/L (ref 23–33)
CREAT SERPL-MCNC: 1.3 MG/DL (ref 0.4–1.2)
GFR SERPL CREATININE-BSD FRML MDRD: 39 ML/MIN/1.73M2
GLUCOSE BLD-MCNC: 143 MG/DL (ref 70–108)
HCT VFR BLD CALC: 24.2 % (ref 37–47)
HEMOGLOBIN: 7.8 GM/DL (ref 12–16)
MCH RBC QN AUTO: 45.1 PG (ref 27–31)
MCHC RBC AUTO-ENTMCNC: 32.4 GM/DL (ref 33–37)
MCV RBC AUTO: 139.5 FL (ref 81–99)
PDW BLD-RTO: 20.4 % (ref 11.5–14.5)
PLATELET # BLD: 575 THOU/MM3 (ref 130–400)
PMV BLD AUTO: 7.6 MCM (ref 7.4–10.4)
POTASSIUM SERPL-SCNC: 3.7 MEQ/L (ref 3.5–5.2)
RBC # BLD: 1.73 MILL/MM3 (ref 4.2–5.4)
SODIUM BLD-SCNC: 139 MEQ/L (ref 135–145)
WBC # BLD: 3.8 THOU/MM3 (ref 4.8–10.8)

## 2017-12-19 PROCEDURE — 1200000000 HC SEMI PRIVATE

## 2017-12-19 PROCEDURE — 71010 XR CHEST PORTABLE: CPT

## 2017-12-19 PROCEDURE — 6360000002 HC RX W HCPCS: Performed by: INTERNAL MEDICINE

## 2017-12-19 PROCEDURE — 6370000000 HC RX 637 (ALT 250 FOR IP): Performed by: INTERNAL MEDICINE

## 2017-12-19 PROCEDURE — 80048 BASIC METABOLIC PNL TOTAL CA: CPT

## 2017-12-19 PROCEDURE — 6370000000 HC RX 637 (ALT 250 FOR IP): Performed by: HOSPITALIST

## 2017-12-19 PROCEDURE — 6370000000 HC RX 637 (ALT 250 FOR IP): Performed by: NURSE PRACTITIONER

## 2017-12-19 PROCEDURE — 36415 COLL VENOUS BLD VENIPUNCTURE: CPT

## 2017-12-19 PROCEDURE — 78315 BONE IMAGING 3 PHASE: CPT

## 2017-12-19 PROCEDURE — 76000 FLUOROSCOPY <1 HR PHYS/QHP: CPT | Performed by: RADIOLOGY

## 2017-12-19 PROCEDURE — A9503 TC99M MEDRONATE: HCPCS | Performed by: INTERNAL MEDICINE

## 2017-12-19 PROCEDURE — 85027 COMPLETE CBC AUTOMATED: CPT

## 2017-12-19 PROCEDURE — 3430000000 HC RX DIAGNOSTIC RADIOPHARMACEUTICAL: Performed by: INTERNAL MEDICINE

## 2017-12-19 PROCEDURE — 2580000003 HC RX 258: Performed by: INTERNAL MEDICINE

## 2017-12-19 PROCEDURE — 99232 SBSQ HOSP IP/OBS MODERATE 35: CPT | Performed by: HOSPITALIST

## 2017-12-19 RX ORDER — BENZONATATE 100 MG/1
100 CAPSULE ORAL 3 TIMES DAILY PRN
Status: DISCONTINUED | OUTPATIENT
Start: 2017-12-19 | End: 2017-12-21 | Stop reason: HOSPADM

## 2017-12-19 RX ORDER — TC 99M MEDRONATE 20 MG/10ML
28.1 INJECTION, POWDER, LYOPHILIZED, FOR SOLUTION INTRAVENOUS
Status: COMPLETED | OUTPATIENT
Start: 2017-12-19 | End: 2017-12-19

## 2017-12-19 RX ORDER — PREDNISONE 20 MG/1
20 TABLET ORAL DAILY
Status: DISCONTINUED | OUTPATIENT
Start: 2017-12-19 | End: 2017-12-21 | Stop reason: HOSPADM

## 2017-12-19 RX ADMIN — BENZONATATE 100 MG: 100 CAPSULE ORAL at 09:00

## 2017-12-19 RX ADMIN — BENZONATATE 100 MG: 100 CAPSULE ORAL at 20:53

## 2017-12-19 RX ADMIN — OXYCODONE HYDROCHLORIDE AND ACETAMINOPHEN 1 TABLET: 5; 325 TABLET ORAL at 05:10

## 2017-12-19 RX ADMIN — FAMOTIDINE 20 MG: 20 TABLET, FILM COATED ORAL at 08:53

## 2017-12-19 RX ADMIN — VALSARTAN 160 MG: 160 TABLET ORAL at 08:53

## 2017-12-19 RX ADMIN — FERROUS SULFATE TAB 325 MG (65 MG ELEMENTAL FE) 325 MG: 325 (65 FE) TAB at 12:01

## 2017-12-19 RX ADMIN — OXCARBAZEPINE 300 MG: 300 TABLET ORAL at 20:54

## 2017-12-19 RX ADMIN — OXYCODONE HYDROCHLORIDE AND ACETAMINOPHEN 1 TABLET: 5; 325 TABLET ORAL at 20:54

## 2017-12-19 RX ADMIN — HYDROXYUREA 500 MG: 500 CAPSULE ORAL at 05:10

## 2017-12-19 RX ADMIN — FERROUS SULFATE TAB 325 MG (65 MG ELEMENTAL FE) 325 MG: 325 (65 FE) TAB at 08:53

## 2017-12-19 RX ADMIN — PANTOPRAZOLE SODIUM 40 MG: 40 TABLET, DELAYED RELEASE ORAL at 05:10

## 2017-12-19 RX ADMIN — ASPIRIN 81 MG: 81 TABLET, COATED ORAL at 08:53

## 2017-12-19 RX ADMIN — Medication 10 ML: at 20:55

## 2017-12-19 RX ADMIN — ATORVASTATIN CALCIUM 20 MG: 20 TABLET, FILM COATED ORAL at 20:54

## 2017-12-19 RX ADMIN — VENLAFAXINE HYDROCHLORIDE 225 MG: 75 CAPSULE, EXTENDED RELEASE ORAL at 08:53

## 2017-12-19 RX ADMIN — FERROUS SULFATE TAB 325 MG (65 MG ELEMENTAL FE) 325 MG: 325 (65 FE) TAB at 15:32

## 2017-12-19 RX ADMIN — HYDROXYUREA 500 MG: 500 CAPSULE ORAL at 20:54

## 2017-12-19 RX ADMIN — OXYCODONE HYDROCHLORIDE AND ACETAMINOPHEN 1 TABLET: 5; 325 TABLET ORAL at 08:58

## 2017-12-19 RX ADMIN — BENZONATATE 100 MG: 100 CAPSULE ORAL at 01:47

## 2017-12-19 RX ADMIN — OXYCODONE HYDROCHLORIDE AND ACETAMINOPHEN 1 TABLET: 5; 325 TABLET ORAL at 13:26

## 2017-12-19 RX ADMIN — PIOGLITAZONE 15 MG: 15 TABLET ORAL at 08:53

## 2017-12-19 RX ADMIN — Medication 28.1 MILLICURIE: at 07:35

## 2017-12-19 RX ADMIN — QUETIAPINE FUMARATE 300 MG: 100 TABLET ORAL at 20:54

## 2017-12-19 RX ADMIN — HYDROXYUREA 500 MG: 500 CAPSULE ORAL at 13:26

## 2017-12-19 RX ADMIN — PREDNISONE 20 MG: 20 TABLET ORAL at 15:32

## 2017-12-19 RX ADMIN — SODIUM CHLORIDE: 9 INJECTION, SOLUTION INTRAVENOUS at 01:47

## 2017-12-19 RX ADMIN — METOPROLOL SUCCINATE 25 MG: 25 TABLET, FILM COATED, EXTENDED RELEASE ORAL at 08:53

## 2017-12-19 RX ADMIN — HYDROCHLOROTHIAZIDE 25 MG: 25 TABLET ORAL at 08:53

## 2017-12-19 RX ADMIN — ENOXAPARIN SODIUM 40 MG: 40 INJECTION SUBCUTANEOUS at 12:01

## 2017-12-19 RX ADMIN — OXCARBAZEPINE 300 MG: 300 TABLET ORAL at 08:53

## 2017-12-19 RX ADMIN — OXYBUTYNIN CHLORIDE 5 MG: 5 TABLET, FILM COATED, EXTENDED RELEASE ORAL at 08:53

## 2017-12-19 ASSESSMENT — PAIN DESCRIPTION - PROGRESSION
CLINICAL_PROGRESSION: GRADUALLY WORSENING
CLINICAL_PROGRESSION: GRADUALLY WORSENING

## 2017-12-19 ASSESSMENT — PAIN SCALES - GENERAL
PAINLEVEL_OUTOF10: 7
PAINLEVEL_OUTOF10: 5
PAINLEVEL_OUTOF10: 7

## 2017-12-19 ASSESSMENT — PAIN DESCRIPTION - ORIENTATION
ORIENTATION: LEFT
ORIENTATION: LOWER

## 2017-12-19 ASSESSMENT — PAIN DESCRIPTION - PAIN TYPE
TYPE: ACUTE PAIN

## 2017-12-19 ASSESSMENT — PAIN DESCRIPTION - ONSET
ONSET: ON-GOING
ONSET: ON-GOING

## 2017-12-19 ASSESSMENT — PAIN DESCRIPTION - LOCATION
LOCATION: BACK
LOCATION: SHOULDER
LOCATION: BACK
LOCATION: BACK

## 2017-12-19 ASSESSMENT — PAIN DESCRIPTION - FREQUENCY: FREQUENCY: INTERMITTENT

## 2017-12-19 ASSESSMENT — PAIN DESCRIPTION - DESCRIPTORS
DESCRIPTORS: ACHING
DESCRIPTORS: ACHING

## 2017-12-19 NOTE — CARE COORDINATION
12/19/17, 3:32 PM    DISCHARGE BARRIERS      Spoke with patient's daughter, Mynor Baron. She prefers that patient go to rehab in [place], preference is rehab/TCU at [address] [place] [address]. Waiting decision on plan for treatment.

## 2017-12-19 NOTE — PROGRESS NOTES
Hospitalist Progress Note    Patient:  [facility]      Unit/Bed:7K-26/026-A    YOB: 1940    MRN: [id number]       Acct: [de-identified]     PCP: Refugio Hurt CNP    Date of Admission: 12/18/2017    Chief Complaint: Acute on chronic LBP    Hospital Course: y.o. female who presented to [address] [place] [address] with a history of degenerative disease presents to the hospital with sudden onset of back pain after fall that she suffered a few days ago. CT scan of his spine done in the emergency room showed evidence of compression fracture.  At this time we are awaiting Bone scan and IR consult for possible kyphoplasty.     Subjective: C/O cough      Medications:  Reviewed    Infusion Medications    dextrose 5 % and 0.33 % NaCl Stopped (12/18/17 1538)    sodium chloride 100 mL/hr at 12/19/17 0147     Scheduled Medications    aspirin  81 mg Oral Daily    atorvastatin  20 mg Oral Daily    [START ON 1/12/2018] cyanocobalamin  1,000 mcg Intramuscular Q30 Days    ferrous sulfate  325 mg Oral TID WC    hydrochlorothiazide  25 mg Oral Daily    metoprolol succinate  25 mg Oral Daily    mupirocin   Topical BID    pantoprazole  40 mg Oral QAM AC    OXcarbazepine  300 mg Oral BID    oxybutynin  5 mg Oral Daily    pioglitazone  15 mg Oral Daily    QUEtiapine  300 mg Oral Nightly    valsartan  160 mg Oral Daily    venlafaxine  225 mg Oral Daily with breakfast    sodium chloride flush  10 mL Intravenous 2 times per day    enoxaparin  40 mg Subcutaneous Q24H    polyethylene glycol-electrolytes  4,000 mL Oral Once    famotidine  20 mg Oral Daily    lidocaine  1 patch Transdermal Daily    hydroxyurea  500 mg Oral TID     PRN Meds: benzonatate, acetaminophen, sodium chloride flush, acetaminophen, magnesium hydroxide, ondansetron, potassium chloride **OR** potassium chloride **OR** potassium chloride, magnesium sulfate, oxyCODONE-acetaminophen      Intake/Output Summary (Last 24 hours) at 12/19/17 1440  Last data filed at 12/19/17 1351   Gross per 24 hour   Intake             2544 ml   Output              550 ml   Net             1994 ml       Diet:  DIET LOW SODIUM 2 GM; Exam:  BP (!) 103/50   Pulse 86   Temp 97.5 °F (36.4 °C) (Oral)   Resp 18   Ht 5' 7\" (1.702 m)   Wt 189 lb 6 oz (85.9 kg)   SpO2 93%   BMI 29.66 kg/m²     General appearance: No apparent distress, appears stated age and cooperative. HEENT: Pupils equal, round, and reactive to light. Conjunctivae/corneas clear. Neck: Supple, with full range of motion. No jugular venous distention. Trachea midline. Respiratory:  Normal respiratory effort. Clear to auscultation, bilaterally without Rales/Wheezes/Rhonchi. Cardiovascular: Regular rate and rhythm with normal S1/S2 without murmurs, rubs or gallops. Abdomen: Soft, non-tender, non-distended with normal bowel sounds. Musculoskeletal: No clubbing, cyanosis or edema bilaterally. Full range of motion without deformity. Skin: Skin color, texture, turgor normal.  No rashes or lesions. Neurologic:  Neurovascularly intact without any focal sensory/motor deficits.  Cranial nerves: II-XII intact, grossly non-focal.  Psychiatric: Alert and oriented, thought content appropriate, normal insight  Capillary Refill: Brisk,< 3 seconds   Peripheral Pulses: +2 palpable, equal bilaterally       Labs:   Recent Labs      12/18/17   0738  12/19/17   1051   WBC  3.5*  3.8*   HGB  9.0*  7.8*   HCT  27.6*  24.2*   PLT  698*  575*     Recent Labs      12/18/17   0738  12/19/17   1051   NA  138  139   K  3.5  3.7   CL  97*  100   CO2  25  27   BUN  53*  53*   CREATININE  1.3*  1.3*   CALCIUM  9.1  8.2*     Recent Labs      12/18/17   0738   AST  43*   ALT  65   BILITOT  0.5   ALKPHOS  92     Recent Labs      12/18/17   0738   INR  1.07     Recent Labs      12/18/17   0738   CKTOTAL  83       Urinalysis:    Lab Results   Component Value Date    NITRU NEGATIVE 12/18/2017    WBCUA 0-2 12/18/2017    BACTERIA NONE 12/18/2017    RBCUA 0-2 12/18/2017    BLOODU NEGATIVE 12/18/2017    GLUCOSEU NEGATIVE 12/18/2017       Radiology:  NM BONE SCAN 3 PHASE   Final Result      Positive three-phase bone scan with activity localizing to a known T12 vertebral body compression fracture of indeterminate age but possibly acute or subacute. Final report electronically signed by Dr. Kelley Brunner on 12/19/2017 1:03 PM      XR Chest Portable   Final Result   1. No acute cardiopulmonary process. **This report has been created using voice recognition software. It may contain minor errors which are inherent in voice recognition technology. **      Final report electronically signed by Dr. Ruth Summers on 12/19/2017 12:46 AM      CT Lumbar Reconstruction WO Post Process   Final Result   AGE-INDETERMINATE, LIKELY ACUTE/SUBACUTE, COMPRESSION FRACTURE OF THE T12 VERTEBRAL BODY WITH RETROPULSED COMPONENTS EXTENDING INTO THE CANAL TO PRODUCE MILD/MODERATE CENTRAL CANAL STENOSIS. NO EVIDENCE OF UNSTABLE FRACTURE. CHARACTERISTICS    OF LIKELY PATHOLOGIC OSTEOPOROTIC COMPRESSION FRACTURE. CORRELATION WITH POINT TENDERNESS RECOMMENDED. POTENTIALLY THIS PATIENT COULD BENEFIT FROM VERTEBROPLASTY FOR PAIN MANAGEMENT. IF THIS IS A POSSIBLE TREATMENT OPTION, CONSULTATION WITH    INTERVENTIONAL RADIOLOGY SCHEDULING ([phone]) CAN ASSIST WITH THIS. MULTILEVEL DEGENERATIVE CHANGES RESULTING IN MULTIPLE AREAS OF LIKELY CLINICALLY SIGNIFICANT CANAL AND FORAMINAL STENOSIS, AS DISCUSSED. **This report has been created using voice recognition software. It may contain minor errors which are inherent in voice recognition technology. **            Final report electronically signed by Dr. Rajni Gross on 12/18/2017 8:54 AM      CT ABDOMEN PELVIS WO IV CONTRAST Additional Contrast? None   Final Result   AGE-INDETERMINATE COMPRESSION FRACTURE OF THE T12 VERTEBRAL BODY WITH RETROPULSED COMPONENTS PRODUCING MILD/MODERATE CENTRAL CANAL STENOSIS AT T12. POSSIBLE CONSTIPATION. NO OTHER EVIDENCE OF POSSIBLE ACUTE ABNORMALITY. INCIDENTAL FINDINGS AS DISCUSSED. **This report has been created using voice recognition software. It may contain minor errors which are inherent in voice recognition technology. **            Final report electronically signed by Dr. Ryan Donato on 12/18/2017 8:47 AM      IR KYPHOPLASTY THORACIC 1 VERTEBRAL BODY    (Results Pending)       Diet: DIET LOW SODIUM 2 GM;    DVT prophylaxis: [x] Lovenox                                 [] SCDs                                 [] SQ Heparin                                 [] Encourage ambulation           [] Already on Anticoagulation     Disposition:    [] Home       [] TCU       [x] Rehab       [] Psych       [] SNF       [] [place]       [] Other-    Code Status: Full Code        Assessment/Plan:        Active Hospital Problems    Diagnosis Date Noted    Compression fracture of thoracic spine, non-traumatic, initial encounter ([facility] [address] Bryce Cristina 12/18/2017       1. Acute on chronic compression fractures  · Pending Bone scan  · IR consult for possible kyphoplasty  · Continue current pain meds    2.  Bronchitis  · DuoNebs  · Will add low dose oral steroids  · Continue Tessalon        Electronically signed by Riley Hartman MD on 12/19/2017 at 2:40 PM

## 2017-12-19 NOTE — PROGRESS NOTES
Patient is candidate for Vertebroplasty of T-12. Instructed nurse ASA will need to be held 5 days prior for procedure with Lovenox held 24 hours prior.  Instructed to call IR  in am, Frederick Summers at ext [phone] to have patient scheduled for procedure

## 2017-12-19 NOTE — H&P
History & Physical        Patient:  Leana Schulz  YOB: 1940    MRN: [id number]     Acct: [de-identified]    PCP: Licha Benavidez CNP    Date of Admission: 12/18/2017    Date of Service: Pt seen/examined on 12/18/2017 and Admitted to Inpatient with expected LOS greater than two midnights due to medical therapy. Placed in Observation. Chief Complaint:  Back pain      History Of Present Illness: 76 y.o. female who presented to [address] [place] [address] with a history of degenerative disease presents to the hospital with sudden onset of back pain after fall that she suffered a few days ago. Patient says she's been unable to walk. She has no symptoms of saddle anesthesia. But has had complaints of finger numbness  A CT scan of his spine done in the emergency room showed evidence of compression fracture. She is being admitted for further care. Past Medical History:          Diagnosis Date    Arthritis     Cancer ([facility]     skin    Diabetes mellitus ([facility]     Hyperlipidemia     Hypertension     Psychiatric problem        Past Surgical History:          Procedure Laterality Date    ARM SURGERY      JOINT REPLACEMENT Bilateral     Knees    OVARY REMOVAL Left        Medications Prior to Admission:      Prior to Admission medications    Medication Sig Start Date End Date Taking?  Authorizing Provider   hydroxyurea (HYDREA) 500 MG chemo capsule  11/8/17  Yes Historical Provider, MD   metoprolol succinate (TOPROL XL) 25 MG extended release tablet  11/1/17  Yes Historical Provider, MD   omeprazole (Joelyn Man) 20 MG delayed release capsule  12/3/17  Yes Historical Provider, MD   pioglitazone (ACTOS) 15 MG tablet take 1 tablet by mouth once daily 11/8/17  Yes Historical Provider, MD   QUEtiapine (SEROQUEL) 300 MG tablet take 1 tablet by mouth at bedtime 11/13/17  Yes Historical Provider, MD   venlafaxine 225 MG extended release tablet take 1 tablet by mouth every morning 12/3/17  Yes Historical Provider, MD   aspirin 81 MG EC tablet Take 81 mg by mouth daily. Yes Historical Provider, MD   atorvastatin (LIPITOR) 20 MG tablet Take 20 mg by mouth daily. 4/3/13  Yes Historical Provider, MD   cyanocobalamin 1000 MCG/ML injection Inject 1,000 mcg into the muscle every 30 days. Yes Historical Provider, MD   ferrous sulfate 325 (65 FE) MG EC tablet Take 325 mg by mouth 3 times daily (with meals). 4/4/13  Yes Historical Provider, MD   hydrochlorothiazide (HYDRODIURIL) 25 MG tablet Take 25 mg by mouth daily. 4/4/13  Yes Historical Provider, MD   OXcarbazepine (TRILEPTAL) 300 MG tablet Take 300 mg by mouth 2 times daily. Yes Historical Provider, MD   oxybutynin (DITROPAN-XL) 5 MG CR tablet Take 5 mg by mouth daily. 4/4/13  Yes Historical Provider, MD   valsartan (DIOVAN) 160 MG tablet Take 160 mg by mouth daily. 4/4/13  Yes Historical Provider, MD   acetaminophen (TYLENOL) 500 MG tablet Take 1,000 mg by mouth every 6 hours as needed. Historical Provider, MD       Allergies:  Prozac [fluoxetine hcl]; Xanax [alprazolam]; and Wellbutrin [bupropion]    Social History:      The patient currently lives at home with her family     TOBACCO:   reports that she has never smoked. She has never used smokeless tobacco.  ETOH:   reports that she does not drink alcohol. Family History:       Reviewed in detail and negative for DM, CAD, Cancer, CVA. Positive as follows:        Problem Relation Age of Onset    Stroke Mother        Diet:  DIET LOW SODIUM 2 GM;    REVIEW OF SYSTEMS:   Pertinent positives as noted in the HPI. All other systems reviewed and negative. PHYSICAL EXAM:    BP (!) 124/55   Pulse 86   Temp 99.1 °F (37.3 °C) (Oral)   Resp 16   Ht 5' 7\" (1.702 m)   Wt 189 lb 6 oz (85.9 kg)   SpO2 92%   BMI 29.66 kg/m²     General appearance:  No apparent distress, appears stated age and cooperative. HEENT:  Normal cephalic, atraumatic without obvious deformity. Pupils equal, round, and reactive to light. Extra ocular muscles intact. Conjunctivae/corneas clear. Neck: Supple, with full range of motion. No jugular venous distention. Trachea midline. Respiratory:  Normal respiratory effort. Clear to auscultation, bilaterally without Rales/Wheezes/Rhonchi. Cardiovascular:  Regular rate and rhythm with normal S1/S2 without murmurs, rubs or gallops. Abdomen: Soft, non-tender, non-distended with normal bowel sounds. Musculoskeletal:  No clubbing, cyanosis or edema bilaterally. Full range of motion without deformity. Skin: Skin color, texture, turgor normal.  No rashes or lesions. Neurologic:  Neurovascularly intact without any focal sensory/motor deficits. Cranial nerves: II-XII intact, grossly non-focal.  Psychiatric:  Alert and oriented, thought content appropriate, normal insight  Capillary Refill: Brisk,< 3 seconds   Peripheral Pulses: +2 palpable, equal bilaterally       Labs:     Recent Labs      12/18/17   0738   WBC  3.5*   HGB  9.0*   HCT  27.6*   PLT  698*     Recent Labs      12/18/17   0738   NA  138   K  3.5   CL  97*   CO2  25   BUN  53*   CREATININE  1.3*   CALCIUM  9.1     Recent Labs      12/18/17   0738   AST  43*   ALT  65   BILITOT  0.5   ALKPHOS  92     Recent Labs      12/18/17   0738   INR  1.07     Recent Labs      12/18/17   0738   CKTOTAL  83       Urinalysis:      Lab Results   Component Value Date    NITRU NEGATIVE 12/18/2017    WBCUA 0-2 12/18/2017    BACTERIA NONE 12/18/2017    RBCUA 0-2 12/18/2017    BLOODU NEGATIVE 12/18/2017    GLUCOSEU NEGATIVE 12/18/2017       Radiology:     CXR: I have reviewed the CXR with the following interpretation: ordered  EKG:  I have reviewed the EKG with the following interpretation: no ST/T wave changes    CT Lumbar Reconstruction WO Post Process   Final Result   AGE-INDETERMINATE, LIKELY ACUTE/SUBACUTE, COMPRESSION FRACTURE OF THE T12 VERTEBRAL BODY WITH RETROPULSED COMPONENTS EXTENDING INTO THE CANAL TO PRODUCE MILD/MODERATE CENTRAL CANAL STENOSIS.  NO Compression fracture of thoracic spine, non-traumatic, initial encounter [facility]) [L71.55CI] 12/18/2017     PLAN:  -Consult interventional radiology for possible kyphoplasty. -Bone scan per recommendations  -Pain control  -PT OT  -Chest x-ray  -Empiric antibiotic therapy for possible left lower lobe pneumonia  Thank you Nicole Courtney CNP for the opportunity to be involved in this patient's care.     Electronically signed by Ree Leiva MD on 12/18/2017 at 7:39 PM

## 2017-12-20 PROCEDURE — 6360000002 HC RX W HCPCS: Performed by: INTERNAL MEDICINE

## 2017-12-20 PROCEDURE — 6370000000 HC RX 637 (ALT 250 FOR IP): Performed by: NURSE PRACTITIONER

## 2017-12-20 PROCEDURE — 6370000000 HC RX 637 (ALT 250 FOR IP): Performed by: INTERNAL MEDICINE

## 2017-12-20 PROCEDURE — G8979 MOBILITY GOAL STATUS: HCPCS

## 2017-12-20 PROCEDURE — 99232 SBSQ HOSP IP/OBS MODERATE 35: CPT | Performed by: HOSPITALIST

## 2017-12-20 PROCEDURE — G8978 MOBILITY CURRENT STATUS: HCPCS

## 2017-12-20 PROCEDURE — 6370000000 HC RX 637 (ALT 250 FOR IP): Performed by: HOSPITALIST

## 2017-12-20 PROCEDURE — 97161 PT EVAL LOW COMPLEX 20 MIN: CPT

## 2017-12-20 PROCEDURE — 1200000000 HC SEMI PRIVATE

## 2017-12-20 PROCEDURE — 2580000003 HC RX 258: Performed by: INTERNAL MEDICINE

## 2017-12-20 PROCEDURE — 97110 THERAPEUTIC EXERCISES: CPT

## 2017-12-20 RX ADMIN — PANTOPRAZOLE SODIUM 40 MG: 40 TABLET, DELAYED RELEASE ORAL at 05:51

## 2017-12-20 RX ADMIN — OXCARBAZEPINE 300 MG: 300 TABLET ORAL at 20:05

## 2017-12-20 RX ADMIN — OXYCODONE HYDROCHLORIDE AND ACETAMINOPHEN 1 TABLET: 5; 325 TABLET ORAL at 14:53

## 2017-12-20 RX ADMIN — OXYCODONE HYDROCHLORIDE AND ACETAMINOPHEN 1 TABLET: 5; 325 TABLET ORAL at 20:12

## 2017-12-20 RX ADMIN — METOPROLOL SUCCINATE 25 MG: 25 TABLET, FILM COATED, EXTENDED RELEASE ORAL at 08:09

## 2017-12-20 RX ADMIN — FERROUS SULFATE TAB 325 MG (65 MG ELEMENTAL FE) 325 MG: 325 (65 FE) TAB at 11:24

## 2017-12-20 RX ADMIN — VALSARTAN 160 MG: 160 TABLET ORAL at 08:09

## 2017-12-20 RX ADMIN — FERROUS SULFATE TAB 325 MG (65 MG ELEMENTAL FE) 325 MG: 325 (65 FE) TAB at 08:09

## 2017-12-20 RX ADMIN — BENZONATATE 100 MG: 100 CAPSULE ORAL at 14:13

## 2017-12-20 RX ADMIN — Medication 10 ML: at 08:19

## 2017-12-20 RX ADMIN — ENOXAPARIN SODIUM 40 MG: 40 INJECTION SUBCUTANEOUS at 13:00

## 2017-12-20 RX ADMIN — PIOGLITAZONE 15 MG: 15 TABLET ORAL at 08:09

## 2017-12-20 RX ADMIN — ATORVASTATIN CALCIUM 20 MG: 20 TABLET, FILM COATED ORAL at 20:05

## 2017-12-20 RX ADMIN — OXYBUTYNIN CHLORIDE 5 MG: 5 TABLET, FILM COATED, EXTENDED RELEASE ORAL at 08:09

## 2017-12-20 RX ADMIN — VENLAFAXINE HYDROCHLORIDE 225 MG: 75 CAPSULE, EXTENDED RELEASE ORAL at 08:10

## 2017-12-20 RX ADMIN — HYDROXYUREA 500 MG: 500 CAPSULE ORAL at 05:51

## 2017-12-20 RX ADMIN — QUETIAPINE FUMARATE 300 MG: 100 TABLET ORAL at 20:05

## 2017-12-20 RX ADMIN — FERROUS SULFATE TAB 325 MG (65 MG ELEMENTAL FE) 325 MG: 325 (65 FE) TAB at 16:47

## 2017-12-20 RX ADMIN — BENZONATATE 100 MG: 100 CAPSULE ORAL at 05:51

## 2017-12-20 RX ADMIN — HYDROCHLOROTHIAZIDE 25 MG: 25 TABLET ORAL at 08:09

## 2017-12-20 RX ADMIN — OXYCODONE HYDROCHLORIDE AND ACETAMINOPHEN 1 TABLET: 5; 325 TABLET ORAL at 06:35

## 2017-12-20 RX ADMIN — Medication 10 ML: at 20:05

## 2017-12-20 RX ADMIN — HYDROXYUREA 500 MG: 500 CAPSULE ORAL at 20:05

## 2017-12-20 RX ADMIN — OXCARBAZEPINE 300 MG: 300 TABLET ORAL at 08:30

## 2017-12-20 RX ADMIN — HYDROXYUREA 500 MG: 500 CAPSULE ORAL at 14:12

## 2017-12-20 RX ADMIN — FAMOTIDINE 20 MG: 20 TABLET, FILM COATED ORAL at 08:09

## 2017-12-20 RX ADMIN — PREDNISONE 20 MG: 20 TABLET ORAL at 08:09

## 2017-12-20 ASSESSMENT — PAIN DESCRIPTION - FREQUENCY
FREQUENCY: CONTINUOUS

## 2017-12-20 ASSESSMENT — PAIN DESCRIPTION - LOCATION
LOCATION: BACK

## 2017-12-20 ASSESSMENT — PAIN DESCRIPTION - PROGRESSION
CLINICAL_PROGRESSION: GRADUALLY IMPROVING
CLINICAL_PROGRESSION: NOT CHANGED

## 2017-12-20 ASSESSMENT — PAIN DESCRIPTION - ONSET
ONSET: ON-GOING

## 2017-12-20 ASSESSMENT — PAIN SCALES - GENERAL
PAINLEVEL_OUTOF10: 7
PAINLEVEL_OUTOF10: 5
PAINLEVEL_OUTOF10: 3
PAINLEVEL_OUTOF10: 5
PAINLEVEL_OUTOF10: 3
PAINLEVEL_OUTOF10: 5
PAINLEVEL_OUTOF10: 4
PAINLEVEL_OUTOF10: 5

## 2017-12-20 ASSESSMENT — PAIN DESCRIPTION - ORIENTATION
ORIENTATION: LOWER;MID

## 2017-12-20 ASSESSMENT — PAIN DESCRIPTION - DESCRIPTORS
DESCRIPTORS: ACHING;PRESSURE
DESCRIPTORS: ACHING

## 2017-12-20 ASSESSMENT — PAIN DESCRIPTION - PAIN TYPE
TYPE: ACUTE PAIN
TYPE: ACUTE PAIN;SURGICAL PAIN
TYPE: ACUTE PAIN
TYPE: ACUTE PAIN;SURGICAL PAIN

## 2017-12-20 NOTE — CARE COORDINATION
12/20/17, 3:02 PM    DISCHARGE BARRIERS      Spoke with Haven Cisneros, [facility] admissions. [facility] can accept tomorrow after 1400. Call made LACP to determine if cost of transport. Spoke with Yrn at [facility], who shares that transport would likely be covered . Transport form should reflect that patient is going to rehab close to family. Call made to daughter, Yvette Barriga to update. She is in agreement.

## 2017-12-20 NOTE — PROGRESS NOTES
Hospitalist Progress Note    Patient:  Adair Marks      Unit/Bed:7K-26/026-A    YOB: 1940    MRN: [id number]       Acct: [de-identified]     PCP: Danilo Paez CNP    Date of Admission: 12/18/2017    Hospital Course: y. o. female who presented to [address] [place] [address] with a history of degenerative disease presents to the hospital with sudden onset of back pain after fall that she suffered a few days ago.  CT scan of his spine done in the emergency room showed evidence of compression fracture.  At this time we are awaiting CM to help us with discharge planning and placement.       Subjective: C/O cough         Medications:  Reviewed    Infusion Medications    Scheduled Medications    predniSONE  20 mg Oral Daily    atorvastatin  20 mg Oral Daily    [START ON 1/12/2018] cyanocobalamin  1,000 mcg Intramuscular Q30 Days    ferrous sulfate  325 mg Oral TID WC    hydrochlorothiazide  25 mg Oral Daily    metoprolol succinate  25 mg Oral Daily    mupirocin   Topical BID    pantoprazole  40 mg Oral QAM AC    OXcarbazepine  300 mg Oral BID    oxybutynin  5 mg Oral Daily    pioglitazone  15 mg Oral Daily    QUEtiapine  300 mg Oral Nightly    valsartan  160 mg Oral Daily    venlafaxine  225 mg Oral Daily with breakfast    sodium chloride flush  10 mL Intravenous 2 times per day    enoxaparin  40 mg Subcutaneous Q24H    polyethylene glycol-electrolytes  4,000 mL Oral Once    famotidine  20 mg Oral Daily    lidocaine  1 patch Transdermal Daily    hydroxyurea  500 mg Oral TID     PRN Meds: benzonatate, acetaminophen, sodium chloride flush, acetaminophen, magnesium hydroxide, ondansetron, potassium chloride **OR** potassium chloride **OR** potassium chloride, magnesium sulfate, oxyCODONE-acetaminophen      Intake/Output Summary (Last 24 hours) at 12/20/17 1426  Last data filed at 12/20/17 0594   Gross per 24 hour   Intake              400 ml   Output              750 ml   Net             -350 OSTEOPOROTIC COMPRESSION FRACTURE. CORRELATION WITH POINT TENDERNESS RECOMMENDED. POTENTIALLY THIS PATIENT COULD BENEFIT FROM VERTEBROPLASTY FOR PAIN MANAGEMENT. IF THIS IS A POSSIBLE TREATMENT OPTION, CONSULTATION WITH    INTERVENTIONAL RADIOLOGY SCHEDULING ([phone]) CAN ASSIST WITH THIS. MULTILEVEL DEGENERATIVE CHANGES RESULTING IN MULTIPLE AREAS OF LIKELY CLINICALLY SIGNIFICANT CANAL AND FORAMINAL STENOSIS, AS DISCUSSED. **This report has been created using voice recognition software. It may contain minor errors which are inherent in voice recognition technology. **            Final report electronically signed by Dr. Jermaine Victor on 12/18/2017 8:54 AM      CT ABDOMEN PELVIS WO IV CONTRAST Additional Contrast? None   Final Result   AGE-INDETERMINATE COMPRESSION FRACTURE OF THE T12 VERTEBRAL BODY WITH RETROPULSED COMPONENTS PRODUCING MILD/MODERATE CENTRAL CANAL STENOSIS AT T12. POSSIBLE CONSTIPATION. NO OTHER EVIDENCE OF POSSIBLE ACUTE ABNORMALITY. INCIDENTAL FINDINGS AS DISCUSSED. **This report has been created using voice recognition software. It may contain minor errors which are inherent in voice recognition technology. **            Final report electronically signed by Dr. Jermaine Victor on 12/18/2017 8:47 AM      IR VERTEBROPLASTY EACH ADDITIONAL    (Results Pending)       Diet: DIET LOW SODIUM 2 GM;    DVT prophylaxis: [x] Lovenox                                 [] SCDs                                 [] SQ Heparin                                 [] Encourage ambulation           [] Already on Anticoagulation     Disposition:    [] Home       [] TCU       [x] Rehab       [] Psych       [] SNF       [] [place]       [] Other-    Code Status: Full Code        Assessment/Plan:      Active Hospital Problems    Diagnosis Date Noted    Compression fracture of thoracic spine, non-traumatic, [facility] [X89.11QN] 12/18/2017 Compression Fx of T12  · PT/OT  · Scheduled for vertebroplasty on the Decemeber 29th by IR  · ASA discontinued (day 1 off)  · CM to arrange for SNF versus TCU    Bronchitis  · DuoNebs  · Continue  low dose oral steroids  · Continue Tessalon  · Will add PPI for GI prophylaxis              Electronically signed by Magdalena Daniel MD on 12/20/2017 at 2:26 PM

## 2017-12-20 NOTE — CARE COORDINATION
12/20/17, 11:20 AM    DISCHARGE BARRIERS      Spoke with Alyse about discharge plan. She is planning ecf/rehab, and is leaving the choice of facility to her daughter, Sajan Mackey. Spoke with daughter, Sajan Mackey, who requests [facility]. Referral made to [facility], spoke with Akua, maximo. [facility] staff will review for possible admission.

## 2017-12-21 VITALS
RESPIRATION RATE: 16 BRPM | OXYGEN SATURATION: 94 % | WEIGHT: 189.38 LBS | HEART RATE: 80 BPM | BODY MASS INDEX: 29.72 KG/M2 | DIASTOLIC BLOOD PRESSURE: 60 MMHG | TEMPERATURE: 98.8 F | SYSTOLIC BLOOD PRESSURE: 148 MMHG | HEIGHT: 67 IN

## 2017-12-21 LAB — GLUCOSE BLD-MCNC: 135 MG/DL (ref 70–108)

## 2017-12-21 PROCEDURE — G8988 SELF CARE GOAL STATUS: HCPCS

## 2017-12-21 PROCEDURE — 97110 THERAPEUTIC EXERCISES: CPT

## 2017-12-21 PROCEDURE — G8987 SELF CARE CURRENT STATUS: HCPCS

## 2017-12-21 PROCEDURE — 6370000000 HC RX 637 (ALT 250 FOR IP): Performed by: HOSPITALIST

## 2017-12-21 PROCEDURE — 6370000000 HC RX 637 (ALT 250 FOR IP): Performed by: NURSE PRACTITIONER

## 2017-12-21 PROCEDURE — 97116 GAIT TRAINING THERAPY: CPT

## 2017-12-21 PROCEDURE — 2580000003 HC RX 258: Performed by: INTERNAL MEDICINE

## 2017-12-21 PROCEDURE — 97166 OT EVAL MOD COMPLEX 45 MIN: CPT

## 2017-12-21 PROCEDURE — 99239 HOSP IP/OBS DSCHRG MGMT >30: CPT | Performed by: HOSPITALIST

## 2017-12-21 PROCEDURE — 6370000000 HC RX 637 (ALT 250 FOR IP): Performed by: INTERNAL MEDICINE

## 2017-12-21 PROCEDURE — 82948 REAGENT STRIP/BLOOD GLUCOSE: CPT

## 2017-12-21 PROCEDURE — 97530 THERAPEUTIC ACTIVITIES: CPT

## 2017-12-21 RX ORDER — VALSARTAN 160 MG/1
160 TABLET ORAL DAILY
Qty: 30 TABLET | Refills: 3 | Status: ON HOLD | OUTPATIENT
Start: 2017-12-21 | End: 2018-01-26 | Stop reason: HOSPADM

## 2017-12-21 RX ORDER — BENZONATATE 100 MG/1
100 CAPSULE ORAL 3 TIMES DAILY PRN
Qty: 30 CAPSULE | Refills: 0 | Status: SHIPPED | OUTPATIENT
Start: 2017-12-21 | End: 2017-12-21

## 2017-12-21 RX ORDER — OXYCODONE HYDROCHLORIDE AND ACETAMINOPHEN 5; 325 MG/1; MG/1
1 TABLET ORAL EVERY 4 HOURS PRN
Qty: 40 TABLET | Refills: 0 | Status: SHIPPED | OUTPATIENT
Start: 2017-12-21 | End: 2017-12-28

## 2017-12-21 RX ORDER — OMEPRAZOLE 20 MG/1
20 CAPSULE, DELAYED RELEASE ORAL DAILY
Qty: 30 CAPSULE | Refills: 0 | Status: ON HOLD | OUTPATIENT
Start: 2017-12-21 | End: 2018-01-26 | Stop reason: HOSPADM

## 2017-12-21 RX ORDER — PREDNISONE 20 MG/1
20 TABLET ORAL DAILY
Qty: 3 TABLET | Refills: 0 | Status: SHIPPED | OUTPATIENT
Start: 2017-12-22 | End: 2017-12-21

## 2017-12-21 RX ORDER — OXCARBAZEPINE 300 MG/1
300 TABLET, FILM COATED ORAL 2 TIMES DAILY
Qty: 60 TABLET | Refills: 3 | Status: ON HOLD | OUTPATIENT
Start: 2017-12-21 | End: 2018-01-26 | Stop reason: HOSPADM

## 2017-12-21 RX ORDER — HYDROXYUREA 500 MG/1
CAPSULE ORAL
Qty: 30 CAPSULE | Refills: 1 | Status: ON HOLD | OUTPATIENT
Start: 2017-12-21 | End: 2018-01-26 | Stop reason: HOSPADM

## 2017-12-21 RX ORDER — LIDOCAINE 50 MG/G
1 PATCH TOPICAL DAILY
Qty: 30 PATCH | Refills: 0 | Status: SHIPPED | OUTPATIENT
Start: 2017-12-22 | End: 2017-12-21

## 2017-12-21 RX ORDER — QUETIAPINE FUMARATE 300 MG/1
TABLET, FILM COATED ORAL
Qty: 60 TABLET | Refills: 0 | Status: ON HOLD | OUTPATIENT
Start: 2017-12-21 | End: 2018-01-26 | Stop reason: HOSPADM

## 2017-12-21 RX ORDER — PIOGLITAZONEHYDROCHLORIDE 15 MG/1
TABLET ORAL
Qty: 30 TABLET | Refills: 0 | Status: ON HOLD | OUTPATIENT
Start: 2017-12-21 | End: 2018-01-26 | Stop reason: HOSPADM

## 2017-12-21 RX ORDER — PREDNISONE 20 MG/1
20 TABLET ORAL DAILY
Qty: 3 TABLET | Refills: 0 | Status: SHIPPED | OUTPATIENT
Start: 2017-12-22 | End: 2017-12-25

## 2017-12-21 RX ORDER — HYDROCHLOROTHIAZIDE 25 MG/1
25 TABLET ORAL DAILY
Qty: 30 TABLET | Refills: 3 | Status: ON HOLD | OUTPATIENT
Start: 2017-12-21 | End: 2018-01-26 | Stop reason: HOSPADM

## 2017-12-21 RX ORDER — METOPROLOL SUCCINATE 25 MG/1
25 TABLET, EXTENDED RELEASE ORAL DAILY
Qty: 30 TABLET | Refills: 0 | Status: ON HOLD | OUTPATIENT
Start: 2017-12-21 | End: 2018-01-26 | Stop reason: HOSPADM

## 2017-12-21 RX ORDER — LANOLIN ALCOHOL/MO/W.PET/CERES
325 CREAM (GRAM) TOPICAL
Qty: 90 TABLET | Refills: 3 | Status: ON HOLD | OUTPATIENT
Start: 2017-12-21 | End: 2018-01-26 | Stop reason: HOSPADM

## 2017-12-21 RX ORDER — OXYBUTYNIN CHLORIDE 5 MG/1
5 TABLET, EXTENDED RELEASE ORAL DAILY
Qty: 30 TABLET | Refills: 3 | Status: ON HOLD | OUTPATIENT
Start: 2017-12-21 | End: 2018-01-26 | Stop reason: HOSPADM

## 2017-12-21 RX ORDER — ATORVASTATIN CALCIUM 20 MG/1
20 TABLET, FILM COATED ORAL DAILY
Qty: 30 TABLET | Refills: 3 | Status: ON HOLD | OUTPATIENT
Start: 2017-12-21 | End: 2018-01-26 | Stop reason: HOSPADM

## 2017-12-21 RX ORDER — LIDOCAINE 50 MG/G
1 PATCH TOPICAL DAILY
Qty: 30 PATCH | Refills: 0 | Status: ON HOLD | OUTPATIENT
Start: 2017-12-22 | End: 2018-01-26 | Stop reason: HOSPADM

## 2017-12-21 RX ORDER — HYDROXYUREA 500 MG/1
500 CAPSULE ORAL 3 TIMES DAILY
Qty: 90 CAPSULE | Refills: 0 | Status: ON HOLD | OUTPATIENT
Start: 2017-12-21 | End: 2018-01-26 | Stop reason: HOSPADM

## 2017-12-21 RX ORDER — BENZONATATE 100 MG/1
100 CAPSULE ORAL 3 TIMES DAILY PRN
Qty: 30 CAPSULE | Refills: 0 | Status: SHIPPED | OUTPATIENT
Start: 2017-12-21 | End: 2017-12-28

## 2017-12-21 RX ORDER — VENLAFAXINE HYDROCHLORIDE 225 MG/1
TABLET, EXTENDED RELEASE ORAL
Qty: 30 TABLET | Refills: 0 | Status: ON HOLD | OUTPATIENT
Start: 2017-12-21 | End: 2018-01-26 | Stop reason: HOSPADM

## 2017-12-21 RX ADMIN — OXCARBAZEPINE 300 MG: 300 TABLET ORAL at 08:00

## 2017-12-21 RX ADMIN — OXYCODONE HYDROCHLORIDE AND ACETAMINOPHEN 1 TABLET: 5; 325 TABLET ORAL at 00:05

## 2017-12-21 RX ADMIN — VALSARTAN 160 MG: 160 TABLET ORAL at 07:50

## 2017-12-21 RX ADMIN — FAMOTIDINE 20 MG: 20 TABLET, FILM COATED ORAL at 07:50

## 2017-12-21 RX ADMIN — PREDNISONE 20 MG: 20 TABLET ORAL at 07:50

## 2017-12-21 RX ADMIN — FERROUS SULFATE TAB 325 MG (65 MG ELEMENTAL FE) 325 MG: 325 (65 FE) TAB at 07:50

## 2017-12-21 RX ADMIN — HYDROXYUREA 500 MG: 500 CAPSULE ORAL at 06:16

## 2017-12-21 RX ADMIN — OXYCODONE HYDROCHLORIDE AND ACETAMINOPHEN 1 TABLET: 5; 325 TABLET ORAL at 04:08

## 2017-12-21 RX ADMIN — Medication 10 ML: at 08:00

## 2017-12-21 RX ADMIN — VENLAFAXINE HYDROCHLORIDE 225 MG: 75 CAPSULE, EXTENDED RELEASE ORAL at 07:50

## 2017-12-21 RX ADMIN — PANTOPRAZOLE SODIUM 40 MG: 40 TABLET, DELAYED RELEASE ORAL at 06:16

## 2017-12-21 RX ADMIN — PIOGLITAZONE 15 MG: 15 TABLET ORAL at 07:53

## 2017-12-21 RX ADMIN — OXYBUTYNIN CHLORIDE 5 MG: 5 TABLET, FILM COATED, EXTENDED RELEASE ORAL at 07:50

## 2017-12-21 RX ADMIN — BENZONATATE 100 MG: 100 CAPSULE ORAL at 07:50

## 2017-12-21 RX ADMIN — METOPROLOL SUCCINATE 25 MG: 25 TABLET, FILM COATED, EXTENDED RELEASE ORAL at 07:50

## 2017-12-21 RX ADMIN — HYDROCHLOROTHIAZIDE 25 MG: 25 TABLET ORAL at 07:50

## 2017-12-21 RX ADMIN — BENZONATATE 100 MG: 100 CAPSULE ORAL at 00:04

## 2017-12-21 ASSESSMENT — PAIN SCALES - GENERAL
PAINLEVEL_OUTOF10: 4
PAINLEVEL_OUTOF10: 6
PAINLEVEL_OUTOF10: 5

## 2017-12-21 ASSESSMENT — PAIN DESCRIPTION - FREQUENCY: FREQUENCY: CONTINUOUS

## 2017-12-21 ASSESSMENT — PAIN DESCRIPTION - ORIENTATION
ORIENTATION: LOWER;MID
ORIENTATION: LOWER;MID

## 2017-12-21 ASSESSMENT — PAIN DESCRIPTION - DESCRIPTORS: DESCRIPTORS: ACHING

## 2017-12-21 ASSESSMENT — PAIN DESCRIPTION - ONSET: ONSET: ON-GOING

## 2017-12-21 ASSESSMENT — PAIN DESCRIPTION - LOCATION
LOCATION: BACK
LOCATION: BACK

## 2017-12-21 ASSESSMENT — PAIN DESCRIPTION - PROGRESSION: CLINICAL_PROGRESSION: NOT CHANGED

## 2017-12-21 ASSESSMENT — PAIN DESCRIPTION - PAIN TYPE: TYPE: ACUTE PAIN

## 2017-12-21 NOTE — PROGRESS NOTES
Jonathan Maxwell 58  INPATIENT OCCUPATIONAL THERAPY  [facility] ORTHOPEDICS 7K  EVALUATION    Time:  Time In: 4823  Time Out: 7960  Timed Code Treatment Minutes: 13 Minutes  Minutes: 23    Date: 2017  Patient Name: Bakari Duron,   Gender: female      MRN: [id number]  : 1940  (76 y.o.)  Referring Practitioner: Lary Garay MD  Diagnosis: Compression Fracture of Thoracic Spine, Non-traumatic  Additional Pertinent Hx: Per ED note, pt is 76 y.o. female that presented to ED with low back pain. Patient stays at home by herself. She is a poor historian. She says about 2 weeks ago she fell after she tripped on the rug at home. She fell backwards. No LOC. No head or neck injury or pain. She has been experiencing pain from lower back as well as her right lower quadrant ever since then and she could not walk now. Pain is at 7/10 worse on back movement and walking. Patient says she is not able to cook for herself because of the pain and she has not eaten for several days    Restrictions/Precautions:  Restrictions/Precautions: General Precautions, Fall Risk    Position Activity Restriction  Spinal Precautions: No Bending, No Lifting, No Twisting  Other position/activity restrictions: Pt to have Kyphoplasty/Vertebroplasty on Dec 29th per RN report. Past Medical History:   Diagnosis Date    Arthritis     Cancer ([address]     skin    Diabetes mellitus ([address]     Hyperlipidemia     Hypertension     Psychiatric problem      Past Surgical History:   Procedure Laterality Date    ARM SURGERY      JOINT REPLACEMENT Bilateral     Knees    OVARY REMOVAL Left            Subjective  Chart Reviewed: Yes  Patient assessed for rehabilitation services?: Yes  Family / Caregiver Present: No    Subjective: ANDREW Espitia OT sdession. Upon arrival patient was sitting up in bed. Pt was agreeable to OT session.      General:  Overall Orientation Status: Within Functional Limits    Vision: Impaired    Hearing: Within functional limits    Pain:  Pain Assessment  Patient Currently in Pain: Yes  Pain Assessment: 0-10  Pain Level: 6  Pain Location: Back  Pain Orientation: Lower;Mid       Social/Functional:  Lives With: Alone  Type of Home: Apartment  Home Layout: One level  Home Access: Level entry  Home Equipment: Standard walker, Cane     Bathroom Shower/Tub: Tub/Shower unit  Bathroom Toilet: Standard  Bathroom Accessibility: Accessible    Receives Help From: Family  ADL Assistance: Independent  Homemaking Assistance: Independent  Homemaking Responsibilities: Yes    Ambulation Assistance: Independent  Transfer Assistance: Independent    Active : Yes  Mode of Transportation: Car  Occupation: Retired  Leisure & Hobbies: [employer]  Additional Comments: Pt recognizes that she may need some assist before returning home. Objective  Overall Cognitive Status: WFL    Sensation  Overall Sensation Status: Impaired (Pt reports tingling in B 1st digits and R 2nd digit. )          LUE AROM (degrees)  LUE AROM : WFL     RUE AROM (degrees)  RUE AROM : WFL     LUE Strength  Gross LUE Strength:  (not tested d/t fx. )    RUE Strength  Gross RUE Strength:  (not tested d/t fx. )    ADL  LE Dressing: Maximum assistance (To don B socks. )     Bed mobility  Supine to Sit: Stand by assistance  Sit to Supine: Stand by assistance  Scooting: Stand by assistance (to scoot to EOB. )    Transfers  Sit to stand: Contact guard assistance (From EOB with min vc for hand placement. )  Stand to sit: Contact guard assistance (Onto EOB with min vc for technique. )    Balance  Sitting Balance: Stand by assistance (Sitting EOB )  Standing Balance: Contact guard assistance     Time: 45 seconds  Activity: Prep for ambulation      Functional Mobility  Functional - Mobility Device: Rolling Walker  Activity: Other  Assist Level: Contact guard assistance  Functional Mobility Comments: 10 feet in room, slow pace, No LOB.       Activity Tolerance:  Activity Tolerance: Patient limited by fatigue, Patient limited by pain    Treatment Initiated:  OT Evaluation completed. See above for details. Pt completed B UE exs to increase strength required to complete ADL routine, x 1 set, x 10 reps, no resistance: shoulder flexion, chest press, bicep curls, horizontal AB/ADduction. slow pace, exhibits min fatigue, requires 2 RBs during exs. Assessment:  Assessment: Pt requires skilled OT intervention to increase indep and safety with all self cares, IADLs, transfers, and functional mobility to decrease fall risk and return to PLOF. Performance deficits / Impairments: Decreased functional mobility , Decreased ADL status, Decreased strength, Decreased endurance, Decreased high-level IADLs, Decreased safe awareness  Prognosis: Good  Discharge Recommendations: Continue to assess pending progress    Clinical Decision Making: Clinical Decision making was of Moderate Complexity as the result of analysis of data from a detailed assessment, a consideration of several treatment options, the presence of comorbidities affecting the plan of care and the need for minimal to moderate modifications or assistance required to complete the evaluation. Patient Education:  Patient Education: OT POC, OT Role, Transfer safety, walker safety, Spinal precautions. Barriers to Learning: none     Equipment Recommendations:  Equipment Needed: No  Other: Defer to next level of care. Safety:  Safety Devices in place: Yes  Type of devices:  All fall risk precautions in place, Call light within reach, Gait belt, Patient at risk for falls, Left in bed, Nurse notified    Plan:  Times per week: 5x  Current Treatment Recommendations: Strengthening, Endurance Training, Functional Mobility Training, Safety Education & Training, Equipment Evaluation, Education, & procurement, Self-Care / ADL, Home Management Training, Patient/Caregiver Education & Training    Goals:  Patient goals : Go Home     Short term goals  Time

## 2017-12-21 NOTE — PLAN OF CARE
Problem: DISCHARGE BARRIERS  Goal: Patient's continuum of care needs are met  Outcome: Ongoing  Patient discharge home vs ECF. See SW note 12/18/17.
Problem: Pain:  Goal: Pain level will decrease  Pain level will decrease    Outcome: Ongoing  Patient continues to take percocet for pain with relief. Pain goal 5/10. Problem: Falls - Risk of  Goal: Absence of falls  Outcome: Ongoing  No falls this shift. Gait belt and walker in use. Problem: DISCHARGE BARRIERS  Goal: Patient's continuum of care needs are met  Outcome: Ongoing  Discharge to ecf vs tcu when stable. Problem: Neurological  Goal: Maximum potential motor/sensory/cognitive function  Outcome: Ongoing  PT/OT evaluations ordered. Problem: GI  Goal: No bowel complications  Outcome: Ongoing  No bm this shift. Ambulate. Stool softeners in use. Problem:   Goal: Adequate urinary output  Outcome: Ongoing  Adequate output this shift. Voiding per commode. Problem: Musculor/Skeletal Functional Status  Goal: Highest potential functional level  Outcome: Ongoing  Patient PT/OT ordered. Standby assist at this time. Problem: DAILY CARE  Goal: Daily care needs are met  Outcome: Ongoing  Patient participates in adls. Problem: Respiratory  Goal: No pulmonary complications  Outcome: Ongoing  Coughing continues. Tessalon pearls. LS remain diminished. Comments: Care plan reviewed with patient and family. Patient and family verbalize understanding of the plan of care and contribute to goal setting.
Problem: Pain:  Goal: Pain level will decrease  Pain level will decrease   Outcome: Ongoing  Pain goal is 6/10. Patient request pain pill for pain 7/10 to right side/back. Problem: Falls - Risk of  Goal: Absence of falls  Outcome: Ongoing  Patient alert and not trying to get out of bed, bed alarm on. Problem: DISCHARGE BARRIERS  Goal: Patient's continuum of care needs are met  Outcome: Ongoing  Planning kyphoplasty tomorrow. Problem: Nutrition  Goal: Optimal nutrition therapy  Outcome: Ongoing  Tolerating diet, no nausea. Problem: Neurological  Goal: Maximum potential motor/sensory/cognitive function  Outcome: Ongoing  Alert and able to move all extremities. Numbness to fingers, bilaterally. Problem: GI  Goal: No bowel complications  Outcome: Ongoing  No bowel movement yet this shift. Problem:   Goal: Adequate urinary output  Outcome: Ongoing  Patient is up to [facility] to void. Problem: Musculor/Skeletal Functional Status  Goal: Highest potential functional level  Outcome: Ongoing  Up to [facility] to void, will work with therapy. Comments: Care plan reviewed with patient and family. Patient and family verbalize understanding of the plan of care and contribute to goal setting.
Problem: Pain:  Goal: Pain level will decrease  Pain level will decrease   Outcome: Ongoing  Pt stated a pain goal of \"6\". Pt rated pain 6 out of 10. PRN pain medication orders obtained and given. White board updated. Pt verbalized understanding of available pain medications. Goal: Control of acute pain  Control of acute pain   Outcome: Ongoing  Pt stated a pain goal of \"6\". Pt rated pain 6 out of 10. PRN pain medication orders obtained and given. White board updated. Pt verbalized understanding of available pain medications. Goal: Control of chronic pain  Control of chronic pain   Outcome: Ongoing  Pt stated a pain goal of \"6\". Pt rated pain 6 out of 10. PRN pain medication orders obtained and given. White board updated. Pt verbalized understanding of available pain medications. Problem: Falls - Risk of  Goal: Absence of falls  Outcome: Ongoing  Patient free from falls this shift. Bed in low position with wheels locked and side rails up x2. Call light in reach. Will continue to monitor. Problem: DISCHARGE BARRIERS  Goal: Patient's continuum of care needs are met  Outcome: Ongoing  Pt remains on 7K, discharge planning in progress. Pt and family considering discharge to apartment vs Rehab in [place] as family lives there. Problem: Nutrition  Goal: Optimal nutrition therapy  Outcome: Ongoing  Pt assisted to order meals. Pt eating 50-75% of meals. Comments: Care plan reviewed with patient and daugher. Patient and daughter verbalize understanding of the plan of care and contribute to goal setting.
the plan of care and contribute to goal setting.

## 2017-12-21 NOTE — PROGRESS NOTES
Physical Therapy   [facility]    Time In: 7202  Time Out: 0830  Timed Code Treatment Minutes: 23 Minutes  Minutes: 23          Date: 2017  Patient Name: Suraj Kim,  Gender:  female        MRN: [id number]  : 1940  (76 y.o.)     Referring Practitioner: Jenny Calhoun MD  Diagnosis: Compression Fracture of Thoracic Spine, Non-traumatic  Additional Pertinent Hx: Per ED note, pt is 76 y.o. female that presented to ED with low back pain. Patient stays at home by herself. She is a poor historian. She says about 2 weeks ago she fell after she tripped on the rug at home. She fell backwards. No LOC. No head or neck injury or pain. She has been experiencing pain from lower back as well as her right lower quadrant ever since then and she could not walk now. Pain is at 7/10 worse on back movement and walking. Patient says she is not able to cook for herself because of the pain and she has not eaten for several days. Past Medical History:   Diagnosis Date    Arthritis     Cancer ([address]     skin    [facility] ([facility]     Hyperlipidemia     Hypertension     Psychiatric problem      Past Surgical History:   Procedure Laterality Date    ARM SURGERY      JOINT REPLACEMENT Bilateral     Knees    OVARY REMOVAL Left        Restrictions/Precautions:  Restrictions/Precautions: General Precautions, Fall Risk    Position Activity Restriction  Spinal Precautions: No Bending, No Lifting, No Twisting  Other position/activity restrictions: Pt to have Kyphoplasty/Vertebroplasty on Dec 29th per RN report. Subjective:  Chart Reviewed: Yes  Family / Caregiver Present: No  Subjective: RN approved session. Pt resting in bed upon arival, pleasant and agreeable to therapy. Requests to get back into bed at end of session. Pain:  Yes.   Pain Assessment  Pain Level: 6     Social/Functional:  Lives With: Alone  Type of Home:

## 2017-12-21 NOTE — PROGRESS NOTES
All patient belongings packed for transport to [facility] via [facility]. Discharge explained. All questions answered. Report called to nurse Shawn Flores at [facility]. All scripts sent.

## 2017-12-21 NOTE — PROGRESS NOTES
Nutrition Assessment    Type and Reason for Visit: Reassess    Nutrition Recommendations: Continue diet and ONS as ordered. May benefit from bowel regimen. Malnutrition Assessment:  · Malnutrition Status: At risk for malnutrition    Nutrition Diagnosis:   · Problem: Altered GI function  · Etiology: related to Constipation     Signs and symptoms:  as evidenced by Patient report of (no BM since admit per I/O )    Nutrition Assessment:  · Subjective Assessment: Pt. seen this am; c/o her \"head swimming\" - RN aware; po good but no BM since admit so discussed activia and pt. agreeable; chemstick 135; meds include actos,prednisone; plan ECF at DC; pt. declines any other ONS; admit s/p fall with compression fracture  · Wound Type: None (reported)  · Current Nutrition Therapies:  · Oral Diet Orders: 2gm Sodium   · Oral Diet intake: 51-75%, %  · Oral Nutrition Supplement (ONS) Orders:  (activia TID)  · ONS intake: Unable to assess (starting today)  · Anthropometric Measures:  · Ht: 5' 7\" (170.2 cm)   · Current Body Wt: 189 lb 6 oz (85.9 kg) (12/18)  · Admission Body Wt: 189 lb 6 oz (85.9 kg) (12/18)  · Usual Body Wt:  (per pt 202-209# last year)  · % Weight Change: 6% unplanned wt loss over 1 year,     · BMI Classification: BMI 25.0 - 29.9 Overweight (29.7)  · Comparative Standards (Estimated Nutrition Needs):  · Estimated Daily Total Kcal: ~1900 kcals  · Estimated Daily Protein (g): ~90 grams    Estimated Intake vs Estimated Needs: Intake Improving    Nutrition Risk Level:  Moderate    Nutrition Interventions:   Continue current diet, Start ONS (pt is DB but does not overselect on CHO - monitoring need to add ADA to order )  Continued Inpatient Monitoring, Education Initiated (best effort healthy po including protein choices at all meals)    Nutrition Evaluation:   · Evaluation: Progressing toward goals   · Goals: 75% or more of meal intake during LOS    · Monitoring: Meal Intake, Supplement Intake, Diet Tolerance, Weight, Pertinent Labs, Constipation    See Adult Nutrition Doc Flowsheet for more detail.      Electronically signed by Disha Lovell RD, CASSIE on 12/21/17 at 11:18 AM    Contact Number: [phone]

## 2017-12-21 NOTE — PROGRESS NOTES
[facility]  INPATIENT PHYSICAL THERAPY  EVALUATION  [facility] ORTHOPEDICS 7K    Time In: 5937  Time Out: 8248  Timed Code Treatment Minutes: 12 Minutes  Minutes: 27          Date: 2017  Patient Name: Stacie Pearson,  Gender:  female        MRN: [id number]  : 1940  (76 y.o.)      Referring Practitioner: Heriberto Hoff MD  Diagnosis: Compression Fracture of Thoracic Spine, Non-traumatic  Additional Pertinent Hx: Per ED note, pt is 76 y.o. female that presented to ED with low back pain. Patient stays at home by herself. She is a poor historian. She says about 2 weeks ago she fell after she tripped on the rug at home. She fell backwards. No LOC. No head or neck injury or pain. She has been experiencing pain from lower back as well as her right lower quadrant ever since then and she could not walk now. Pain is at 7/10 worse on back movement and walking. Patient says she is not able to cook for herself because of the pain and she has not eaten for several days. Past Medical History:   Diagnosis Date    Arthritis     Cancer ([facility] [address]     skin    Diabetes mellitus ([facility] [facility]     Hyperlipidemia     Hypertension     Psychiatric problem      Past Surgical History:   Procedure Laterality Date    ARM SURGERY      JOINT REPLACEMENT Bilateral     Knees    OVARY REMOVAL Left        Restrictions/Precautions:  Restrictions/Precautions: General Precautions, Fall Risk        Position Activity Restriction  Spinal Precautions: No Bending, No Lifting, No Twisting  Other position/activity restrictions: Pt to have Kyphoplasty/Vertebroplasty on Dec 29th per RN report. Subjective:  Chart Reviewed: Yes  Patient assessed for rehabilitation services?: Yes  Subjective: Pt resting in bed and agrees to therapy    General:  Overall Orientation Status: Within Functional Limits       Pain:  Yes.   Pain Assessment  Pain Assessment: 0-10  Pain Level: 3       Social/Functional History:    Lives With: Alone  Type of reasonable expectations for measurable functional outcomes    REQUIRES PT FOLLOW UP: Yes  Discharge Recommendations: Continue to assess pending progress, Patient would benefit from continued therapy after discharge    Patient Education:  Patient Education: plan of care and LE exercises    Equipment Recommendations:  Equipment Needed: No    Safety:  Type of devices: All fall risk precautions in place, Left in chair, Call light within reach, Gait belt, Patient at risk for falls, Left in bed, Nurse notified    Plan:  Times per week: 5x O  Times per day: Daily  Current Treatment Recommendations: Strengthening, Balance Training, Endurance Training, Functional Mobility Training, Gait Training, Safety Education & Training, Home Exercise Program, Patient/Caregiver Education & Training    Goals:  Patient goals : go to family's or SNF until able to get procedure done  Short term goals  Time Frame for Short term goals: 3 days  Short term goal 1: Pt to be Mod I for supine <> sit to get in/out of bed  Short term goal 2: Pt to be Mod I for sit <> stand to get up to ambulate  Short term goal 3: Pt to ambulate > 100 ft with RW with Supervision for household distances  Long term goals  Time Frame for Long term goals : not set due to short ELOS    Evaluation Complexity: Based on the findings of patient history, examination, clinical presentation, and decision making during this evaluation, the evaluation of Alyse Holter  is of low complexity. PT G-Codes  Functional Limitation: Mobility: Walking and moving around  Mobility: Walking and Moving Around Current Status (): At least 40 percent but less than 60 percent impaired, limited or restricted  Mobility: Walking and Moving Around Goal Status ():  At least 20 percent but less than 40 percent impaired, limited or restricted       AM-PAC Inpatient Mobility without Stair Climbing Raw Score : 15  AM-PAC Inpatient without Stair Climbing T-Scale Score : 43.03  Mobility Inpatient

## 2017-12-21 NOTE — DISCHARGE SUMMARY
12/19/2017    BUN 53 12/19/2017    CREATININE 1.3 12/19/2017    CALCIUM 8.2 12/19/2017         Significant Diagnostic Studies    Radiology:   IR INTERVENTIONAL RADIOLOGY PROCEDURE REQUEST   Final Result   1. Ronak is suffering from a pathologic compression fracture T12, secondary to osteoporosis. Yumi Esteves RECOMMENDATIONS:   1. Alyse Is an appropriate candidate for vertebroplasty and I believe will obtain significant pain relief from this procedure. .    2. Patient will need to be off anticoagulation for 5 days prior to the procedure. If you agree with this plan of action, please have your office to telephone the interventional scheduling desk at [phone] [employer] ([phone]). As always, thank you for allowing me to participate in the care of your patient. If you have any questions, please feel free to contact me at your convenience. Sincerely,            Nita Hurst M.D.            **This report has been created using voice recognition software. It may contain minor errors which are inherent in voice recognition technology. **      Final report electronically signed by Dr. Colonel Stafford on 12/19/2017 6:55 PM      NM BONE SCAN 3 PHASE   Final Result      Positive three-phase bone scan with activity localizing to a known T12 vertebral body compression fracture of indeterminate age but possibly acute or subacute. Final report electronically signed by Dr. Abdullahi Cartwright on 12/19/2017 1:03 PM      XR Chest Portable   Final Result   1. No acute cardiopulmonary process. **This report has been created using voice recognition software. It may contain minor errors which are inherent in voice recognition technology. **      Final report electronically signed by Dr. Magda Pérez on 12/19/2017 12:46 AM      CT Lumbar Reconstruction WO Post Process   Final Result   AGE-INDETERMINATE, LIKELY ACUTE/SUBACUTE, COMPRESSION FRACTURE OF THE T12 VERTEBRAL BODY WITH RETROPULSED Supplements: Other Oral Supplement (see comment)      Follow-up visits:   Kina See, DIAZ  [address]  [address] [facility]  [phone]    Schedule an appointment as soon as possible for a visit in 1 week      [address] [address]  [phone]  Go today      [address]  [phone]  On 2017  VETEBREPLASTY AT 8:45 AM NEEDS TO BE NPO FOR 4 HOURS AND HAVE . COME TO OUTPATIENT NURSING FOR CHECK IN         Discharge Medications:      Abby Johnson   Home Medication Instructions OC    Printed on:17 0827   Medication Information                      acetaminophen (TYLENOL) 500 MG tablet  Take 1,000 mg by mouth every 6 hours as needed. atorvastatin (LIPITOR) 20 MG tablet  Take 1 tablet by mouth daily             benzonatate (TESSALON) 100 MG capsule  Take 1 capsule by mouth 3 times daily as needed for Cough             cyanocobalamin 1000 MCG/ML injection  Inject 1,000 mcg into the muscle every 30 days.              ferrous sulfate 325 (65 Fe) MG EC tablet  Take 1 tablet by mouth 3 times daily (with meals)             hydrochlorothiazide (HYDRODIURIL) 25 MG tablet  Take 1 tablet by mouth daily             hydroxyurea (HYDREA) 500 MG chemo capsule  1 tab daily             hydroxyurea (HYDREA) 500 MG chemo capsule  Take 1 capsule by mouth 3 times daily             lidocaine (LIDODERM) 5 %  Place 1 patch onto the skin daily 12 hours on, 12 hours off.             magnesium hydroxide (MILK OF MAGNESIA) 400 MG/5ML suspension  Take 30 mLs by mouth daily as needed for Constipation             metoprolol succinate (TOPROL XL) 25 MG extended release tablet  Take 1 tablet by mouth daily             omeprazole (PRILOSEC) 20 MG delayed release capsule  Take 1 capsule by mouth Daily             OXcarbazepine (TRILEPTAL) 300 MG tablet  Take 1 tablet by mouth 2 times daily             oxybutynin (DITROPAN-XL) 5 MG extended

## 2017-12-28 ENCOUNTER — HOSPITAL ENCOUNTER (OUTPATIENT)
Age: 77
Setting detail: SPECIMEN
Discharge: HOME | End: 2017-12-28
Payer: MEDICARE

## 2017-12-28 LAB
-: ABNORMAL
AMORPHOUS: ABNORMAL
BACTERIA: ABNORMAL
BILIRUBIN URINE: ABNORMAL
CASTS UA: ABNORMAL /LPF (ref 0–2)
COLOR: ABNORMAL
COMMENT UA: ABNORMAL
CRYSTALS, UA: ABNORMAL /HPF
EPITHELIAL CELLS UA: ABNORMAL /HPF (ref 0–5)
GLUCOSE URINE: NEGATIVE
KETONES, URINE: NEGATIVE
LEUKOCYTE ESTERASE, URINE: ABNORMAL
MUCUS: ABNORMAL
NITRITE, URINE: NEGATIVE
OTHER OBSERVATIONS UA: ABNORMAL
PH UA: 5 (ref 5–8)
PROTEIN UA: ABNORMAL
RBC UA: ABNORMAL /HPF (ref 0–2)
RENAL EPITHELIAL, UA: ABNORMAL /HPF
SPECIFIC GRAVITY UA: 1.02 (ref 1–1.03)
TRICHOMONAS: ABNORMAL
TURBIDITY: ABNORMAL
URINE HGB: NEGATIVE
UROBILINOGEN, URINE: NORMAL
WBC UA: ABNORMAL /HPF (ref 0–5)
YEAST: ABNORMAL

## 2017-12-28 PROCEDURE — 81001 URINALYSIS AUTO W/SCOPE: CPT

## 2017-12-28 PROCEDURE — 87086 URINE CULTURE/COLONY COUNT: CPT

## 2017-12-29 ENCOUNTER — HOSPITAL ENCOUNTER (OUTPATIENT)
Dept: INTERVENTIONAL RADIOLOGY/VASCULAR | Age: 77
Discharge: HOME | End: 2017-12-29
Payer: MEDICARE

## 2017-12-29 LAB
CULTURE: NORMAL
CULTURE: NORMAL
Lab: NORMAL
Lab: NORMAL
SPECIMEN DESCRIPTION: NORMAL
SPECIMEN DESCRIPTION: NORMAL
STATUS: NORMAL

## 2018-01-10 ENCOUNTER — HOSPITAL ENCOUNTER (OUTPATIENT)
Age: 78
Setting detail: SPECIMEN
Discharge: HOME | End: 2018-01-10
Payer: MEDICARE

## 2018-01-16 ENCOUNTER — HOSPITAL ENCOUNTER (OUTPATIENT)
Age: 78
Setting detail: SPECIMEN
Discharge: HOME | End: 2018-01-16
Payer: MEDICARE

## 2018-01-16 LAB
HCT VFR BLD CALC: 25.3 % (ref 36–46)
HEMOGLOBIN: 8.1 G/DL (ref 12–16)
MCH RBC QN AUTO: 37.4 PG (ref 26–34)
MCHC RBC AUTO-ENTMCNC: 32.2 G/DL (ref 31–37)
MCV RBC AUTO: 116.2 FL (ref 80–100)
NRBC AUTOMATED: ABNORMAL PER 100 WBC
PDW BLD-RTO: 32.7 % (ref 11.5–14.5)
PLATELET # BLD: 523 K/UL (ref 130–400)
PMV BLD AUTO: 7.6 FL (ref 6–12)
RBC # BLD: 2.18 M/UL (ref 4–5.2)
WBC # BLD: 9.8 K/UL (ref 3.5–11)

## 2018-01-16 PROCEDURE — 36415 COLL VENOUS BLD VENIPUNCTURE: CPT

## 2018-01-16 PROCEDURE — 85027 COMPLETE CBC AUTOMATED: CPT

## 2018-01-16 PROCEDURE — P9603 ONE-WAY ALLOW PRORATED MILES: HCPCS

## 2018-01-19 ENCOUNTER — HOSPITAL ENCOUNTER (OUTPATIENT)
Age: 78
Setting detail: SPECIMEN
Discharge: HOME | End: 2018-01-19
Payer: MEDICARE

## 2018-01-19 LAB
ABSOLUTE EOS #: 0.07 K/UL (ref 0–0.4)
ABSOLUTE IMMATURE GRANULOCYTE: ABNORMAL K/UL (ref 0–0.3)
ABSOLUTE LYMPH #: 0.91 K/UL (ref 1–4.8)
ABSOLUTE MONO #: 1.26 K/UL (ref 0.2–0.8)
ANION GAP SERPL CALCULATED.3IONS-SCNC: 11 MMOL/L (ref 9–17)
BASOPHILS # BLD: 1 %
BASOPHILS ABSOLUTE: 0.07 K/UL (ref 0–0.2)
BUN BLDV-MCNC: 16 MG/DL (ref 8–23)
BUN/CREAT BLD: 24 (ref 9–20)
CALCIUM SERPL-MCNC: 8.8 MG/DL (ref 8.6–10.4)
CHLORIDE BLD-SCNC: 95 MMOL/L (ref 98–107)
CO2: 29 MMOL/L (ref 20–31)
CREAT SERPL-MCNC: 0.67 MG/DL (ref 0.5–0.9)
DIFFERENTIAL TYPE: ABNORMAL
EOSINOPHILS RELATIVE PERCENT: 1 % (ref 1–4)
GFR AFRICAN AMERICAN: >60 ML/MIN
GFR NON-AFRICAN AMERICAN: >60 ML/MIN
GFR SERPL CREATININE-BSD FRML MDRD: ABNORMAL ML/MIN/{1.73_M2}
GFR SERPL CREATININE-BSD FRML MDRD: ABNORMAL ML/MIN/{1.73_M2}
GLUCOSE BLD-MCNC: 67 MG/DL (ref 70–99)
HCT VFR BLD CALC: 25.9 % (ref 36–46)
HEMOGLOBIN: 8.2 G/DL (ref 12–16)
IMMATURE GRANULOCYTES: ABNORMAL %
LYMPHOCYTES # BLD: 13 % (ref 24–44)
MCH RBC QN AUTO: 36.6 PG (ref 26–34)
MCHC RBC AUTO-ENTMCNC: 31.5 G/DL (ref 31–37)
MCV RBC AUTO: 116.1 FL (ref 80–100)
METAMYELOCYTES ABSOLUTE COUNT: 0.14 K/UL
METAMYELOCYTES: 2 %
MONOCYTES # BLD: 18 % (ref 1–7)
MORPHOLOGY: ABNORMAL
NRBC AUTOMATED: ABNORMAL PER 100 WBC
PDW BLD-RTO: 32.5 % (ref 11.5–14.5)
PLATELET # BLD: 472 K/UL (ref 130–400)
PLATELET ESTIMATE: ABNORMAL
PMV BLD AUTO: 7.5 FL (ref 6–12)
POTASSIUM SERPL-SCNC: 4.1 MMOL/L (ref 3.7–5.3)
RBC # BLD: 2.23 M/UL (ref 4–5.2)
RBC # BLD: ABNORMAL 10*6/UL
SEG NEUTROPHILS: 65 % (ref 36–66)
SEGMENTED NEUTROPHILS ABSOLUTE COUNT: 4.55 K/UL (ref 1.8–7.7)
SODIUM BLD-SCNC: 135 MMOL/L (ref 135–144)
WBC # BLD: 7 K/UL (ref 3.5–11)
WBC # BLD: ABNORMAL 10*3/UL

## 2018-01-19 PROCEDURE — 36415 COLL VENOUS BLD VENIPUNCTURE: CPT

## 2018-01-19 PROCEDURE — 80048 BASIC METABOLIC PNL TOTAL CA: CPT

## 2018-01-19 PROCEDURE — P9603 ONE-WAY ALLOW PRORATED MILES: HCPCS

## 2018-01-19 PROCEDURE — 85025 COMPLETE CBC W/AUTO DIFF WBC: CPT

## 2018-01-22 ENCOUNTER — APPOINTMENT (OUTPATIENT)
Dept: CT IMAGING | Age: 78
DRG: 023 | End: 2018-01-22
Payer: MEDICARE

## 2018-01-22 ENCOUNTER — HOSPITAL ENCOUNTER (INPATIENT)
Age: 78
LOS: 4 days | Discharge: HOSPICE | DRG: 023 | End: 2018-01-26
Attending: EMERGENCY MEDICINE | Admitting: PSYCHIATRY & NEUROLOGY
Payer: MEDICARE

## 2018-01-22 ENCOUNTER — APPOINTMENT (OUTPATIENT)
Dept: INTERVENTIONAL RADIOLOGY/VASCULAR | Age: 78
DRG: 023 | End: 2018-01-22
Payer: MEDICARE

## 2018-01-22 ENCOUNTER — APPOINTMENT (OUTPATIENT)
Dept: GENERAL RADIOLOGY | Age: 78
DRG: 023 | End: 2018-01-22
Payer: MEDICARE

## 2018-01-22 ENCOUNTER — ANESTHESIA EVENT (OUTPATIENT)
Dept: INTERVENTIONAL RADIOLOGY/VASCULAR | Age: 78
DRG: 023 | End: 2018-01-22
Payer: MEDICARE

## 2018-01-22 ENCOUNTER — ANESTHESIA (OUTPATIENT)
Dept: INTERVENTIONAL RADIOLOGY/VASCULAR | Age: 78
DRG: 023 | End: 2018-01-22
Payer: MEDICARE

## 2018-01-22 VITALS — TEMPERATURE: 96.8 F | DIASTOLIC BLOOD PRESSURE: 83 MMHG | SYSTOLIC BLOOD PRESSURE: 141 MMHG | OXYGEN SATURATION: 100 %

## 2018-01-22 DIAGNOSIS — I63.9 CEREBROVASCULAR ACCIDENT (CVA), UNSPECIFIED MECHANISM (HCC): Primary | ICD-10-CM

## 2018-01-22 LAB
-: NORMAL
ABSOLUTE EOS #: 0 K/UL (ref 0–0.4)
ABSOLUTE EOS #: 0 K/UL (ref 0–0.4)
ABSOLUTE IMMATURE GRANULOCYTE: 0 K/UL (ref 0–0.3)
ABSOLUTE IMMATURE GRANULOCYTE: 0.07 K/UL (ref 0–0.3)
ABSOLUTE LYMPH #: 0.65 K/UL (ref 1–4.8)
ABSOLUTE LYMPH #: 0.77 K/UL (ref 1–4.8)
ABSOLUTE MONO #: 0.34 K/UL (ref 0.1–0.8)
ABSOLUTE MONO #: 0.72 K/UL (ref 0.1–0.8)
ALBUMIN SERPL-MCNC: 3 G/DL (ref 3.5–5.2)
ALBUMIN/GLOBULIN RATIO: 0.8 (ref 1–2.5)
ALLEN TEST: ABNORMAL
ALP BLD-CCNC: 389 U/L (ref 35–104)
ALT SERPL-CCNC: 50 U/L (ref 5–33)
AMORPHOUS: NORMAL
ANION GAP SERPL CALCULATED.3IONS-SCNC: 15 MMOL/L (ref 9–17)
ANION GAP SERPL CALCULATED.3IONS-SCNC: 21 MMOL/L (ref 9–17)
ANION GAP: 12 MMOL/L (ref 7–16)
ANION GAP: 13 MMOL/L (ref 7–16)
AST SERPL-CCNC: 40 U/L
BACTERIA: NORMAL
BASOPHILS # BLD: 0 % (ref 0–2)
BASOPHILS # BLD: 0 % (ref 0–2)
BASOPHILS ABSOLUTE: 0 K/UL (ref 0–0.2)
BASOPHILS ABSOLUTE: 0 K/UL (ref 0–0.2)
BILIRUB SERPL-MCNC: 0.58 MG/DL (ref 0.3–1.2)
BILIRUBIN URINE: NEGATIVE
BUN BLDV-MCNC: 18 MG/DL (ref 8–23)
BUN BLDV-MCNC: 18 MG/DL (ref 8–23)
BUN/CREAT BLD: ABNORMAL (ref 9–20)
BUN/CREAT BLD: ABNORMAL (ref 9–20)
CALCIUM IONIZED: 1.2 MMOL/L (ref 1.13–1.33)
CALCIUM SERPL-MCNC: 8.4 MG/DL (ref 8.6–10.4)
CALCIUM SERPL-MCNC: 8.6 MG/DL (ref 8.6–10.4)
CASTS UA: NORMAL /LPF (ref 0–8)
CHLORIDE BLD-SCNC: 100 MMOL/L (ref 98–107)
CHLORIDE BLD-SCNC: 97 MMOL/L (ref 98–107)
CO2: 19 MMOL/L (ref 20–31)
CO2: 25 MMOL/L (ref 20–31)
COLOR: YELLOW
COMMENT UA: ABNORMAL
CREAT SERPL-MCNC: 0.45 MG/DL (ref 0.5–0.9)
CREAT SERPL-MCNC: 0.56 MG/DL (ref 0.5–0.9)
CRYSTALS, UA: NORMAL /HPF
DIFFERENTIAL TYPE: ABNORMAL
DIFFERENTIAL TYPE: ABNORMAL
EOSINOPHILS RELATIVE PERCENT: 0 % (ref 1–4)
EOSINOPHILS RELATIVE PERCENT: 0 % (ref 1–4)
EPITHELIAL CELLS UA: NORMAL /HPF (ref 0–5)
FIBRINOGEN: 206 MG/DL (ref 140–420)
FIO2: ABNORMAL
GFR AFRICAN AMERICAN: >60 ML/MIN
GFR AFRICAN AMERICAN: >60 ML/MIN
GFR NON-AFRICAN AMERICAN: >60 ML/MIN
GFR NON-AFRICAN AMERICAN: ABNORMAL ML/MIN
GFR SERPL CREATININE-BSD FRML MDRD: >60 ML/MIN
GFR SERPL CREATININE-BSD FRML MDRD: ABNORMAL ML/MIN
GFR SERPL CREATININE-BSD FRML MDRD: ABNORMAL ML/MIN/{1.73_M2}
GFR SERPL CREATININE-BSD FRML MDRD: NORMAL ML/MIN/{1.73_M2}
GLUCOSE BLD-MCNC: 123 MG/DL (ref 65–105)
GLUCOSE BLD-MCNC: 142 MG/DL (ref 70–99)
GLUCOSE BLD-MCNC: 146 MG/DL (ref 74–100)
GLUCOSE BLD-MCNC: 151 MG/DL (ref 74–100)
GLUCOSE BLD-MCNC: 165 MG/DL (ref 65–105)
GLUCOSE BLD-MCNC: 202 MG/DL (ref 70–99)
GLUCOSE URINE: NEGATIVE
HCO3 VENOUS: 27.8 MMOL/L (ref 22–29)
HCT VFR BLD CALC: 29.2 % (ref 36.3–47.1)
HCT VFR BLD CALC: 30.2 % (ref 36.3–47.1)
HEMOGLOBIN: 8.7 G/DL (ref 11.9–15.1)
HEMOGLOBIN: 9.2 G/DL (ref 11.9–15.1)
IMMATURE GRANULOCYTES: 0 %
IMMATURE GRANULOCYTES: 1 %
KETONES, URINE: ABNORMAL
LEUKOCYTE ESTERASE, URINE: NEGATIVE
LYMPHOCYTES # BLD: 10 % (ref 24–44)
LYMPHOCYTES # BLD: 9 % (ref 24–44)
MAGNESIUM: 1.7 MG/DL (ref 1.6–2.6)
MCH RBC QN AUTO: 36.7 PG (ref 25.2–33.5)
MCH RBC QN AUTO: 36.9 PG (ref 25.2–33.5)
MCHC RBC AUTO-ENTMCNC: 29.8 G/DL (ref 28.4–34.8)
MCHC RBC AUTO-ENTMCNC: 30.5 G/DL (ref 28.4–34.8)
MCV RBC AUTO: 121.3 FL (ref 82.6–102.9)
MCV RBC AUTO: 123.2 FL (ref 82.6–102.9)
MODE: ABNORMAL
MONOCYTES # BLD: 11 % (ref 1–7)
MONOCYTES # BLD: 4 % (ref 1–7)
MORPHOLOGY: ABNORMAL
MORPHOLOGY: ABNORMAL
MRSA, DNA, NASAL: NORMAL
MUCUS: NORMAL
NEGATIVE BASE EXCESS, ART: ABNORMAL (ref 0–2)
NEGATIVE BASE EXCESS, ART: ABNORMAL (ref 0–2)
NEGATIVE BASE EXCESS, VEN: ABNORMAL (ref 0–2)
NITRITE, URINE: NEGATIVE
NRBC AUTOMATED: 0 PER 100 WBC
NRBC AUTOMATED: 0 PER 100 WBC
O2 DEVICE/FLOW/%: ABNORMAL
O2 SAT, VEN: 87 % (ref 60–85)
OTHER OBSERVATIONS UA: NORMAL
PATIENT TEMP: ABNORMAL
PCO2, VEN: 37.7 MM HG (ref 41–51)
PDW BLD-RTO: ABNORMAL % (ref 11.8–14.4)
PDW BLD-RTO: ABNORMAL % (ref 11.8–14.4)
PH UA: 5 (ref 5–8)
PH VENOUS: 7.48 (ref 7.32–7.43)
PHOSPHORUS: 3.2 MG/DL (ref 2.6–4.5)
PLATELET # BLD: 621 K/UL (ref 138–453)
PLATELET # BLD: 647 K/UL (ref 138–453)
PLATELET ESTIMATE: ABNORMAL
PLATELET ESTIMATE: ABNORMAL
PMV BLD AUTO: 9.4 FL (ref 8.1–13.5)
PMV BLD AUTO: 9.5 FL (ref 8.1–13.5)
PO2, VEN: 48.9 MM HG (ref 30–50)
POC CHLORIDE: 101 MMOL/L (ref 98–107)
POC CHLORIDE: 99 MMOL/L (ref 98–107)
POC CREATININE: 0.6 MG/DL (ref 0.51–1.19)
POC CREATININE: <0.3 MG/DL (ref 0.51–1.19)
POC HCO3: 26.3 MMOL/L (ref 21–28)
POC HCO3: 26.8 MMOL/L (ref 21–28)
POC HEMATOCRIT: 31 % (ref 36–46)
POC HEMATOCRIT: 32 % (ref 36–46)
POC HEMOGLOBIN: 10.7 G/DL (ref 12–16)
POC HEMOGLOBIN: 10.8 G/DL (ref 12–16)
POC IONIZED CALCIUM: 1.09 MMOL/L (ref 1.15–1.33)
POC IONIZED CALCIUM: 1.19 MMOL/L (ref 1.15–1.33)
POC LACTIC ACID: 0.48 MMOL/L (ref 0.56–1.39)
POC LACTIC ACID: 0.71 MMOL/L (ref 0.56–1.39)
POC O2 SATURATION: 100 % (ref 94–98)
POC O2 SATURATION: 100 % (ref 94–98)
POC PCO2 TEMP: ABNORMAL MM HG
POC PCO2: 43.6 MM HG (ref 35–48)
POC PCO2: 44 MM HG (ref 35–48)
POC PH TEMP: ABNORMAL
POC PH: 7.38 (ref 7.35–7.45)
POC PH: 7.4 (ref 7.35–7.45)
POC PO2 TEMP: ABNORMAL MM HG
POC PO2: 345 MM HG (ref 83–108)
POC PO2: 345.4 MM HG (ref 83–108)
POC POTASSIUM: 3.2 MMOL/L (ref 3.5–4.5)
POC POTASSIUM: 3.7 MMOL/L (ref 3.5–4.5)
POC SODIUM: 139 MMOL/L (ref 138–146)
POC SODIUM: 140 MMOL/L (ref 138–146)
POSITIVE BASE EXCESS, ART: 1 (ref 0–3)
POSITIVE BASE EXCESS, ART: 2 (ref 0–3)
POSITIVE BASE EXCESS, VEN: 4 (ref 0–3)
POTASSIUM SERPL-SCNC: 3.8 MMOL/L (ref 3.7–5.3)
POTASSIUM SERPL-SCNC: 3.8 MMOL/L (ref 3.7–5.3)
PROTEIN UA: ABNORMAL
RBC # BLD: 2.37 M/UL (ref 3.95–5.11)
RBC # BLD: 2.49 M/UL (ref 3.95–5.11)
RBC # BLD: ABNORMAL 10*6/UL
RBC # BLD: ABNORMAL 10*6/UL
RBC UA: NORMAL /HPF (ref 0–4)
RENAL EPITHELIAL, UA: NORMAL /HPF
SAMPLE SITE: ABNORMAL
SEG NEUTROPHILS: 78 % (ref 36–66)
SEG NEUTROPHILS: 87 % (ref 36–66)
SEGMENTED NEUTROPHILS ABSOLUTE COUNT: 5.06 K/UL (ref 1.8–7.7)
SEGMENTED NEUTROPHILS ABSOLUTE COUNT: 7.39 K/UL (ref 1.8–7.7)
SODIUM BLD-SCNC: 137 MMOL/L (ref 135–144)
SODIUM BLD-SCNC: 140 MMOL/L (ref 135–144)
SPECIFIC GRAVITY UA: 1.08 (ref 1–1.03)
SPECIMEN DESCRIPTION: NORMAL
TCO2 (CALC), ART: 28 MMOL/L (ref 22–29)
TCO2 (CALC), ART: 28 MMOL/L (ref 22–29)
TOTAL CO2, VENOUS: 29 MMOL/L (ref 23–30)
TOTAL PROTEIN: 6.6 G/DL (ref 6.4–8.3)
TRICHOMONAS: NORMAL
TROPONIN INTERP: ABNORMAL
TROPONIN INTERP: ABNORMAL
TROPONIN T: 0.1 NG/ML
TROPONIN T: 0.13 NG/ML
TURBIDITY: CLEAR
URINE HGB: ABNORMAL
UROBILINOGEN, URINE: NORMAL
WBC # BLD: 6.5 K/UL (ref 3.5–11.3)
WBC # BLD: 8.5 K/UL (ref 3.5–11.3)
WBC # BLD: ABNORMAL 10*3/UL
WBC # BLD: ABNORMAL 10*3/UL
WBC UA: NORMAL /HPF (ref 0–5)
YEAST: NORMAL

## 2018-01-22 PROCEDURE — 84100 ASSAY OF PHOSPHORUS: CPT

## 2018-01-22 PROCEDURE — 99291 CRITICAL CARE FIRST HOUR: CPT | Performed by: PSYCHIATRY & NEUROLOGY

## 2018-01-22 PROCEDURE — 6360000002 HC RX W HCPCS

## 2018-01-22 PROCEDURE — 83605 ASSAY OF LACTIC ACID: CPT

## 2018-01-22 PROCEDURE — 2000000003 HC NEURO ICU R&B

## 2018-01-22 PROCEDURE — 2500000003 HC RX 250 WO HCPCS: Performed by: EMERGENCY MEDICINE

## 2018-01-22 PROCEDURE — 82330 ASSAY OF CALCIUM: CPT

## 2018-01-22 PROCEDURE — 99285 EMERGENCY DEPT VISIT HI MDM: CPT

## 2018-01-22 PROCEDURE — 0BH17EZ INSERTION OF ENDOTRACHEAL AIRWAY INTO TRACHEA, VIA NATURAL OR ARTIFICIAL OPENING: ICD-10-PCS | Performed by: ANESTHESIOLOGY

## 2018-01-22 PROCEDURE — 99223 1ST HOSP IP/OBS HIGH 75: CPT | Performed by: PSYCHIATRY & NEUROLOGY

## 2018-01-22 PROCEDURE — 2500000003 HC RX 250 WO HCPCS: Performed by: NURSE ANESTHETIST, CERTIFIED REGISTERED

## 2018-01-22 PROCEDURE — 82803 BLOOD GASES ANY COMBINATION: CPT

## 2018-01-22 PROCEDURE — A4300 CATH IMPL VASC ACCESS PORTAL: HCPCS

## 2018-01-22 PROCEDURE — 94002 VENT MGMT INPAT INIT DAY: CPT

## 2018-01-22 PROCEDURE — B314YZZ FLUOROSCOPY OF LEFT COMMON CAROTID ARTERY USING OTHER CONTRAST: ICD-10-PCS | Performed by: PSYCHIATRY & NEUROLOGY

## 2018-01-22 PROCEDURE — 6360000004 HC RX CONTRAST MEDICATION: Performed by: EMERGENCY MEDICINE

## 2018-01-22 PROCEDURE — 84132 ASSAY OF SERUM POTASSIUM: CPT

## 2018-01-22 PROCEDURE — 51702 INSERT TEMP BLADDER CATH: CPT

## 2018-01-22 PROCEDURE — 84295 ASSAY OF SERUM SODIUM: CPT

## 2018-01-22 PROCEDURE — C1769 GUIDE WIRE: HCPCS

## 2018-01-22 PROCEDURE — 6360000002 HC RX W HCPCS: Performed by: NURSE PRACTITIONER

## 2018-01-22 PROCEDURE — 03CG3ZZ EXTIRPATION OF MATTER FROM INTRACRANIAL ARTERY, PERCUTANEOUS APPROACH: ICD-10-PCS | Performed by: PSYCHIATRY & NEUROLOGY

## 2018-01-22 PROCEDURE — 82947 ASSAY GLUCOSE BLOOD QUANT: CPT

## 2018-01-22 PROCEDURE — B317YZZ FLUOROSCOPY OF LEFT INTERNAL CAROTID ARTERY USING OTHER CONTRAST: ICD-10-PCS | Performed by: PSYCHIATRY & NEUROLOGY

## 2018-01-22 PROCEDURE — B41FYZZ FLUOROSCOPY OF RIGHT LOWER EXTREMITY ARTERIES USING OTHER CONTRAST: ICD-10-PCS | Performed by: PSYCHIATRY & NEUROLOGY

## 2018-01-22 PROCEDURE — 85025 COMPLETE CBC W/AUTO DIFF WBC: CPT

## 2018-01-22 PROCEDURE — 94770 HC ETCO2 MONITOR DAILY: CPT

## 2018-01-22 PROCEDURE — 3700000000 HC ANESTHESIA ATTENDED CARE

## 2018-01-22 PROCEDURE — 81001 URINALYSIS AUTO W/SCOPE: CPT

## 2018-01-22 PROCEDURE — 3700000001 HC ADD 15 MINUTES (ANESTHESIA)

## 2018-01-22 PROCEDURE — 87086 URINE CULTURE/COLONY COUNT: CPT

## 2018-01-22 PROCEDURE — 71045 X-RAY EXAM CHEST 1 VIEW: CPT

## 2018-01-22 PROCEDURE — B31BYZZ FLUOROSCOPY OF LEFT EXTERNAL CAROTID ARTERY USING OTHER CONTRAST: ICD-10-PCS | Performed by: PSYCHIATRY & NEUROLOGY

## 2018-01-22 PROCEDURE — 2580000003 HC RX 258: Performed by: NURSE ANESTHETIST, CERTIFIED REGISTERED

## 2018-01-22 PROCEDURE — 2500000003 HC RX 250 WO HCPCS: Performed by: NURSE PRACTITIONER

## 2018-01-22 PROCEDURE — 5A1945Z RESPIRATORY VENTILATION, 24-96 CONSECUTIVE HOURS: ICD-10-PCS | Performed by: PSYCHIATRY & NEUROLOGY

## 2018-01-22 PROCEDURE — 03CJ3ZZ EXTIRPATION OF MATTER FROM LEFT COMMON CAROTID ARTERY, PERCUTANEOUS APPROACH: ICD-10-PCS | Performed by: PSYCHIATRY & NEUROLOGY

## 2018-01-22 PROCEDURE — 85384 FIBRINOGEN ACTIVITY: CPT

## 2018-01-22 PROCEDURE — 2580000003 HC RX 258: Performed by: EMERGENCY MEDICINE

## 2018-01-22 PROCEDURE — 83735 ASSAY OF MAGNESIUM: CPT

## 2018-01-22 PROCEDURE — 70496 CT ANGIOGRAPHY HEAD: CPT

## 2018-01-22 PROCEDURE — 87641 MR-STAPH DNA AMP PROBE: CPT

## 2018-01-22 PROCEDURE — 61645 PERQ ART M-THROMBECT &/NFS: CPT | Performed by: PSYCHIATRY & NEUROLOGY

## 2018-01-22 PROCEDURE — 6360000002 HC RX W HCPCS: Performed by: NURSE ANESTHETIST, CERTIFIED REGISTERED

## 2018-01-22 PROCEDURE — B31RYZZ FLUOROSCOPY OF INTRACRANIAL ARTERIES USING OTHER CONTRAST: ICD-10-PCS | Performed by: PSYCHIATRY & NEUROLOGY

## 2018-01-22 PROCEDURE — 70450 CT HEAD/BRAIN W/O DYE: CPT

## 2018-01-22 PROCEDURE — 80053 COMPREHEN METABOLIC PANEL: CPT

## 2018-01-22 PROCEDURE — 84484 ASSAY OF TROPONIN QUANT: CPT

## 2018-01-22 PROCEDURE — 2580000003 HC RX 258: Performed by: PSYCHIATRY & NEUROLOGY

## 2018-01-22 PROCEDURE — 03CN3ZZ EXTIRPATION OF MATTER FROM LEFT EXTERNAL CAROTID ARTERY, PERCUTANEOUS APPROACH: ICD-10-PCS | Performed by: PSYCHIATRY & NEUROLOGY

## 2018-01-22 PROCEDURE — 2580000003 HC RX 258: Performed by: NURSE PRACTITIONER

## 2018-01-22 PROCEDURE — 85014 HEMATOCRIT: CPT

## 2018-01-22 PROCEDURE — 82565 ASSAY OF CREATININE: CPT

## 2018-01-22 PROCEDURE — 3E03317 INTRODUCTION OF OTHER THROMBOLYTIC INTO PERIPHERAL VEIN, PERCUTANEOUS APPROACH: ICD-10-PCS | Performed by: PSYCHIATRY & NEUROLOGY

## 2018-01-22 PROCEDURE — 6370000000 HC RX 637 (ALT 250 FOR IP): Performed by: NURSE PRACTITIONER

## 2018-01-22 PROCEDURE — 03CL3ZZ EXTIRPATION OF MATTER FROM LEFT INTERNAL CAROTID ARTERY, PERCUTANEOUS APPROACH: ICD-10-PCS | Performed by: PSYCHIATRY & NEUROLOGY

## 2018-01-22 PROCEDURE — 93005 ELECTROCARDIOGRAM TRACING: CPT

## 2018-01-22 PROCEDURE — 6360000002 HC RX W HCPCS: Performed by: EMERGENCY MEDICINE

## 2018-01-22 PROCEDURE — 82435 ASSAY OF BLOOD CHLORIDE: CPT

## 2018-01-22 PROCEDURE — 70498 CT ANGIOGRAPHY NECK: CPT

## 2018-01-22 PROCEDURE — 94762 N-INVAS EAR/PLS OXIMTRY CONT: CPT

## 2018-01-22 PROCEDURE — 80048 BASIC METABOLIC PNL TOTAL CA: CPT

## 2018-01-22 PROCEDURE — S0028 INJECTION, FAMOTIDINE, 20 MG: HCPCS | Performed by: NURSE PRACTITIONER

## 2018-01-22 RX ORDER — SODIUM CHLORIDE 0.9 % (FLUSH) 0.9 %
10 SYRINGE (ML) INJECTION PRN
Status: DISCONTINUED | OUTPATIENT
Start: 2018-01-22 | End: 2018-01-26

## 2018-01-22 RX ORDER — ACETAMINOPHEN 325 MG/1
650 TABLET ORAL EVERY 4 HOURS PRN
Status: DISCONTINUED | OUTPATIENT
Start: 2018-01-22 | End: 2018-01-26 | Stop reason: HOSPADM

## 2018-01-22 RX ORDER — LABETALOL HYDROCHLORIDE 5 MG/ML
10 INJECTION, SOLUTION INTRAVENOUS
Status: DISCONTINUED | OUTPATIENT
Start: 2018-01-22 | End: 2018-01-24

## 2018-01-22 RX ORDER — PROPOFOL 10 MG/ML
10 INJECTION, EMULSION INTRAVENOUS
Status: DISCONTINUED | OUTPATIENT
Start: 2018-01-22 | End: 2018-01-22

## 2018-01-22 RX ORDER — DEXTROSE MONOHYDRATE 50 MG/ML
100 INJECTION, SOLUTION INTRAVENOUS PRN
Status: DISCONTINUED | OUTPATIENT
Start: 2018-01-22 | End: 2018-01-26 | Stop reason: HOSPADM

## 2018-01-22 RX ORDER — METOPROLOL SUCCINATE 25 MG/1
25 TABLET, EXTENDED RELEASE ORAL DAILY
Status: DISCONTINUED | OUTPATIENT
Start: 2018-01-22 | End: 2018-01-23

## 2018-01-22 RX ORDER — DEXTROSE MONOHYDRATE 25 G/50ML
12.5 INJECTION, SOLUTION INTRAVENOUS
Status: ACTIVE | OUTPATIENT
Start: 2018-01-22 | End: 2018-01-22

## 2018-01-22 RX ORDER — SODIUM CHLORIDE 9 MG/ML
INJECTION, SOLUTION INTRAVENOUS CONTINUOUS
Status: DISCONTINUED | OUTPATIENT
Start: 2018-01-22 | End: 2018-01-23

## 2018-01-22 RX ORDER — ATORVASTATIN CALCIUM 40 MG/1
40 TABLET, FILM COATED ORAL NIGHTLY
Status: DISCONTINUED | OUTPATIENT
Start: 2018-01-22 | End: 2018-01-26

## 2018-01-22 RX ORDER — DEXTROSE MONOHYDRATE 25 G/50ML
12.5 INJECTION, SOLUTION INTRAVENOUS PRN
Status: DISCONTINUED | OUTPATIENT
Start: 2018-01-22 | End: 2018-01-26 | Stop reason: HOSPADM

## 2018-01-22 RX ORDER — SODIUM CHLORIDE 9 MG/ML
INJECTION, SOLUTION INTRAVENOUS CONTINUOUS PRN
Status: DISCONTINUED | OUTPATIENT
Start: 2018-01-22 | End: 2018-01-22 | Stop reason: SDUPTHER

## 2018-01-22 RX ORDER — PROPOFOL 10 MG/ML
INJECTION, EMULSION INTRAVENOUS PRN
Status: DISCONTINUED | OUTPATIENT
Start: 2018-01-22 | End: 2018-01-22 | Stop reason: SDUPTHER

## 2018-01-22 RX ORDER — DEXAMETHASONE SODIUM PHOSPHATE 10 MG/ML
INJECTION INTRAMUSCULAR; INTRAVENOUS PRN
Status: DISCONTINUED | OUTPATIENT
Start: 2018-01-22 | End: 2018-01-22 | Stop reason: SDUPTHER

## 2018-01-22 RX ORDER — ACETAMINOPHEN 325 MG/1
650 TABLET ORAL EVERY 4 HOURS PRN
Status: DISCONTINUED | OUTPATIENT
Start: 2018-01-22 | End: 2018-01-22

## 2018-01-22 RX ORDER — SODIUM CHLORIDE 0.9 % (FLUSH) 0.9 %
10 SYRINGE (ML) INJECTION EVERY 12 HOURS SCHEDULED
Status: DISCONTINUED | OUTPATIENT
Start: 2018-01-22 | End: 2018-01-26

## 2018-01-22 RX ORDER — SODIUM CHLORIDE 0.9 % (FLUSH) 0.9 %
10 SYRINGE (ML) INJECTION EVERY 12 HOURS SCHEDULED
Status: DISCONTINUED | OUTPATIENT
Start: 2018-01-22 | End: 2018-01-22

## 2018-01-22 RX ORDER — ROCURONIUM BROMIDE 10 MG/ML
INJECTION, SOLUTION INTRAVENOUS PRN
Status: DISCONTINUED | OUTPATIENT
Start: 2018-01-22 | End: 2018-01-22 | Stop reason: SDUPTHER

## 2018-01-22 RX ORDER — NICOTINE POLACRILEX 4 MG
15 LOZENGE BUCCAL PRN
Status: DISCONTINUED | OUTPATIENT
Start: 2018-01-22 | End: 2018-01-26 | Stop reason: HOSPADM

## 2018-01-22 RX ORDER — IODIXANOL 270 MG/ML
140 INJECTION, SOLUTION INTRAVASCULAR
Status: COMPLETED | OUTPATIENT
Start: 2018-01-22 | End: 2018-01-22

## 2018-01-22 RX ORDER — 0.9 % SODIUM CHLORIDE 0.9 %
1000 INTRAVENOUS SOLUTION INTRAVENOUS ONCE
Status: COMPLETED | OUTPATIENT
Start: 2018-01-22 | End: 2018-01-22

## 2018-01-22 RX ORDER — ONDANSETRON 2 MG/ML
4 INJECTION INTRAMUSCULAR; INTRAVENOUS EVERY 6 HOURS PRN
Status: DISCONTINUED | OUTPATIENT
Start: 2018-01-22 | End: 2018-01-26 | Stop reason: HOSPADM

## 2018-01-22 RX ORDER — LABETALOL HYDROCHLORIDE 5 MG/ML
20 INJECTION, SOLUTION INTRAVENOUS ONCE
Status: COMPLETED | OUTPATIENT
Start: 2018-01-22 | End: 2018-01-22

## 2018-01-22 RX ORDER — SODIUM CHLORIDE 0.9 % (FLUSH) 0.9 %
10 SYRINGE (ML) INJECTION PRN
Status: DISCONTINUED | OUTPATIENT
Start: 2018-01-22 | End: 2018-01-22

## 2018-01-22 RX ORDER — MANNITOL 250 MG/ML
75 INJECTION, SOLUTION INTRAVENOUS ONCE
Status: DISCONTINUED | OUTPATIENT
Start: 2018-01-22 | End: 2018-01-23

## 2018-01-22 RX ORDER — FENTANYL CITRATE 50 UG/ML
INJECTION, SOLUTION INTRAMUSCULAR; INTRAVENOUS PRN
Status: DISCONTINUED | OUTPATIENT
Start: 2018-01-22 | End: 2018-01-22 | Stop reason: SDUPTHER

## 2018-01-22 RX ORDER — PROPOFOL 10 MG/ML
INJECTION, EMULSION INTRAVENOUS
Status: COMPLETED
Start: 2018-01-22 | End: 2018-01-22

## 2018-01-22 RX ADMIN — ROCURONIUM BROMIDE 10 MG: 10 INJECTION INTRAVENOUS at 14:10

## 2018-01-22 RX ADMIN — ROCURONIUM BROMIDE 50 MG: 10 INJECTION INTRAVENOUS at 11:41

## 2018-01-22 RX ADMIN — ROCURONIUM BROMIDE 30 MG: 10 INJECTION INTRAVENOUS at 13:24

## 2018-01-22 RX ADMIN — SODIUM CHLORIDE 100 ML/HR: 9 INJECTION, SOLUTION INTRAVENOUS at 16:08

## 2018-01-22 RX ADMIN — PROPOFOL 150 MG: 10 INJECTION, EMULSION INTRAVENOUS at 11:41

## 2018-01-22 RX ADMIN — SODIUM CHLORIDE 1000 ML: 9 INJECTION, SOLUTION INTRAVENOUS at 22:45

## 2018-01-22 RX ADMIN — PROPOFOL 20 MCG/KG/MIN: 10 INJECTION, EMULSION INTRAVENOUS at 17:29

## 2018-01-22 RX ADMIN — PHENYLEPHRINE HYDROCHLORIDE 100 MCG: 10 INJECTION INTRAVENOUS at 13:06

## 2018-01-22 RX ADMIN — IOPAMIDOL 90 ML: 755 INJECTION, SOLUTION INTRAVENOUS at 10:58

## 2018-01-22 RX ADMIN — PHENYLEPHRINE HYDROCHLORIDE 100 MCG: 10 INJECTION INTRAVENOUS at 13:39

## 2018-01-22 RX ADMIN — IODIXANOL 140 ML: 270 INJECTION, SOLUTION INTRAVASCULAR at 14:32

## 2018-01-22 RX ADMIN — FAMOTIDINE 20 MG: 10 INJECTION, SOLUTION INTRAVENOUS at 20:25

## 2018-01-22 RX ADMIN — Medication 10 ML: at 20:25

## 2018-01-22 RX ADMIN — ROCURONIUM BROMIDE 20 MG: 10 INJECTION INTRAVENOUS at 14:57

## 2018-01-22 RX ADMIN — ROCURONIUM BROMIDE 20 MG: 10 INJECTION INTRAVENOUS at 12:44

## 2018-01-22 RX ADMIN — NICARDIPINE HYDROCHLORIDE 5 MG/HR: 0.1 INJECTION, SOLUTION INTRAVENOUS at 17:15

## 2018-01-22 RX ADMIN — FENTANYL CITRATE 100 MCG: 50 INJECTION INTRAMUSCULAR; INTRAVENOUS at 14:31

## 2018-01-22 RX ADMIN — ROCURONIUM BROMIDE 20 MG: 10 INJECTION INTRAVENOUS at 14:32

## 2018-01-22 RX ADMIN — SODIUM CHLORIDE: 9 INJECTION, SOLUTION INTRAVENOUS at 11:40

## 2018-01-22 RX ADMIN — INSULIN LISPRO 1 UNITS: 100 INJECTION, SOLUTION INTRAVENOUS; SUBCUTANEOUS at 17:27

## 2018-01-22 RX ADMIN — PHENYLEPHRINE HYDROCHLORIDE 100 MCG: 10 INJECTION INTRAVENOUS at 12:50

## 2018-01-22 RX ADMIN — NICARDIPINE HYDROCHLORIDE 5 MG/HR: 0.1 INJECTION, SOLUTION INTRAVENOUS at 15:45

## 2018-01-22 RX ADMIN — DEXAMETHASONE SODIUM PHOSPHATE 10 MG: 10 INJECTION INTRAMUSCULAR; INTRAVENOUS at 13:37

## 2018-01-22 RX ADMIN — PHENYLEPHRINE HYDROCHLORIDE 100 MCG: 10 INJECTION INTRAVENOUS at 14:38

## 2018-01-22 RX ADMIN — PHENYLEPHRINE HYDROCHLORIDE 200 MCG: 10 INJECTION INTRAVENOUS at 13:14

## 2018-01-22 RX ADMIN — PROPOFOL 20 MCG/KG/MIN: 10 INJECTION, EMULSION INTRAVENOUS at 15:30

## 2018-01-22 RX ADMIN — Medication 0.2 MCG/KG/HR: at 22:02

## 2018-01-22 RX ADMIN — CEFTRIAXONE SODIUM 1 G: 1 INJECTION, POWDER, FOR SOLUTION INTRAMUSCULAR; INTRAVENOUS at 20:25

## 2018-01-22 RX ADMIN — PHENYLEPHRINE HYDROCHLORIDE 100 MCG: 10 INJECTION INTRAVENOUS at 12:45

## 2018-01-22 RX ADMIN — LABETALOL HYDROCHLORIDE 20 MG: 5 INJECTION, SOLUTION INTRAVENOUS at 16:00

## 2018-01-22 RX ADMIN — PHENYLEPHRINE HYDROCHLORIDE 200 MCG: 10 INJECTION INTRAVENOUS at 12:57

## 2018-01-22 ASSESSMENT — PULMONARY FUNCTION TESTS
PIF_VALUE: 22
PIF_VALUE: 0
PIF_VALUE: 21
PIF_VALUE: 20
PIF_VALUE: 21
PIF_VALUE: 20
PIF_VALUE: 0
PIF_VALUE: 23
PIF_VALUE: 21
PIF_VALUE: 20
PIF_VALUE: 22
PIF_VALUE: 23
PIF_VALUE: 2
PIF_VALUE: 21
PIF_VALUE: 20
PIF_VALUE: 22
PIF_VALUE: 19
PIF_VALUE: 19
PIF_VALUE: 0
PIF_VALUE: 23
PIF_VALUE: 19
PIF_VALUE: 22
PIF_VALUE: 21
PIF_VALUE: 20
PIF_VALUE: 25
PIF_VALUE: 25
PIF_VALUE: 21
PIF_VALUE: 20
PIF_VALUE: 20
PIF_VALUE: 25
PIF_VALUE: 19
PIF_VALUE: 21
PIF_VALUE: 26
PIF_VALUE: 21
PIF_VALUE: 19
PIF_VALUE: 22
PIF_VALUE: 22
PIF_VALUE: 21
PIF_VALUE: 0
PIF_VALUE: 22
PIF_VALUE: 21
PIF_VALUE: 19
PIF_VALUE: 15
PIF_VALUE: 17
PIF_VALUE: 15
PIF_VALUE: 23
PIF_VALUE: 20
PIF_VALUE: 21
PIF_VALUE: 20
PIF_VALUE: 22
PIF_VALUE: 0
PIF_VALUE: 21
PIF_VALUE: 19
PIF_VALUE: 19
PIF_VALUE: 0
PIF_VALUE: 21
PIF_VALUE: 20
PIF_VALUE: 20
PIF_VALUE: 19
PIF_VALUE: 20
PIF_VALUE: 22
PIF_VALUE: 20
PIF_VALUE: 23
PIF_VALUE: 20
PIF_VALUE: 21
PIF_VALUE: 24
PIF_VALUE: 21
PIF_VALUE: 23
PIF_VALUE: 21
PIF_VALUE: 0
PIF_VALUE: 22
PIF_VALUE: 25
PIF_VALUE: 0
PIF_VALUE: 21
PIF_VALUE: 21
PIF_VALUE: 22
PIF_VALUE: 20
PIF_VALUE: 20
PIF_VALUE: 25
PIF_VALUE: 23
PIF_VALUE: 21
PIF_VALUE: 19
PIF_VALUE: 22
PIF_VALUE: 19
PIF_VALUE: 22
PIF_VALUE: 27
PIF_VALUE: 21
PIF_VALUE: 23
PIF_VALUE: 0
PIF_VALUE: 22
PIF_VALUE: 32
PIF_VALUE: 21
PIF_VALUE: 23
PIF_VALUE: 19
PIF_VALUE: 21
PIF_VALUE: 22
PIF_VALUE: 22
PIF_VALUE: 0
PIF_VALUE: 21
PIF_VALUE: 21
PIF_VALUE: 19
PIF_VALUE: 20
PIF_VALUE: 23
PIF_VALUE: 23
PIF_VALUE: 26
PIF_VALUE: 20
PIF_VALUE: 22
PIF_VALUE: 21
PIF_VALUE: 22
PIF_VALUE: 20
PIF_VALUE: 21
PIF_VALUE: 20
PIF_VALUE: 21
PIF_VALUE: 0
PIF_VALUE: 19
PIF_VALUE: 2
PIF_VALUE: 22
PIF_VALUE: 19
PIF_VALUE: 21
PIF_VALUE: 21
PIF_VALUE: 20
PIF_VALUE: 21
PIF_VALUE: 20
PIF_VALUE: 22
PIF_VALUE: 21
PIF_VALUE: 21
PIF_VALUE: 22
PIF_VALUE: 22
PIF_VALUE: 24
PIF_VALUE: 23
PIF_VALUE: 21
PIF_VALUE: 20
PIF_VALUE: 26
PIF_VALUE: 21
PIF_VALUE: 20
PIF_VALUE: 21
PIF_VALUE: 23
PIF_VALUE: 26
PIF_VALUE: 22
PIF_VALUE: 22
PIF_VALUE: 21
PIF_VALUE: 24
PIF_VALUE: 21
PIF_VALUE: 23
PIF_VALUE: 20
PIF_VALUE: 19
PIF_VALUE: 0
PIF_VALUE: 2
PIF_VALUE: 20
PIF_VALUE: 21
PIF_VALUE: 21
PIF_VALUE: 0
PIF_VALUE: 2
PIF_VALUE: 20
PIF_VALUE: 22
PIF_VALUE: 20
PIF_VALUE: 23
PIF_VALUE: 19
PIF_VALUE: 0
PIF_VALUE: 21
PIF_VALUE: 20
PIF_VALUE: 8
PIF_VALUE: 22
PIF_VALUE: 23
PIF_VALUE: 22
PIF_VALUE: 20
PIF_VALUE: 19
PIF_VALUE: 25
PIF_VALUE: 21
PIF_VALUE: 23
PIF_VALUE: 19
PIF_VALUE: 0
PIF_VALUE: 23
PIF_VALUE: 21
PIF_VALUE: 23
PIF_VALUE: 16
PIF_VALUE: 21
PIF_VALUE: 23
PIF_VALUE: 18
PIF_VALUE: 24
PIF_VALUE: 21
PIF_VALUE: 18
PIF_VALUE: 22
PIF_VALUE: 21
PIF_VALUE: 3
PIF_VALUE: 21
PIF_VALUE: 26
PIF_VALUE: 22
PIF_VALUE: 2
PIF_VALUE: 24
PIF_VALUE: 3
PIF_VALUE: 20
PIF_VALUE: 22
PIF_VALUE: 23
PIF_VALUE: 21
PIF_VALUE: 21
PIF_VALUE: 22
PIF_VALUE: 25
PIF_VALUE: 24
PIF_VALUE: 21
PIF_VALUE: 24
PIF_VALUE: 20
PIF_VALUE: 23
PIF_VALUE: 20
PIF_VALUE: 24
PIF_VALUE: 23
PIF_VALUE: 19
PIF_VALUE: 22
PIF_VALUE: 22
PIF_VALUE: 20
PIF_VALUE: 22
PIF_VALUE: 19

## 2018-01-22 NOTE — SEDATION DOCUMENTATION
Patient vomiting bright green liquid. Patient orally suctioned for moderate amount of emesis. Decision made to intubate patient per Anesthesia . Dr Suzi Morocho notified.

## 2018-01-22 NOTE — H&P
Component Value Date    WBC 6.5 01/22/2018    HGB 9.2 (L) 01/22/2018    HCT 30.2 (L) 01/22/2018     (H) 01/22/2018    ALT 50 (H) 01/22/2018    AST 40 (H) 01/22/2018     01/22/2018    K 3.8 01/22/2018    CL 97 (L) 01/22/2018    CREATININE <0.30 (L) 01/22/2018    BUN 18 01/22/2018    CO2 25 01/22/2018    INR 1.07 12/18/2017     24 HOUR INTAKE/OUTPUT:  Intake/Output Summary (Last 24 hours) at 01/22/18 1525  Last data filed at 01/22/18 1336   Gross per 24 hour   Intake              500 ml   Output              920 ml   Net             -420 ml     Ct Head Wo Contrast    Result Date: 1/22/2018  EXAMINATION: CT OF THE HEAD WITHOUT CONTRAST,  1/22/2018 10:28 am TECHNIQUE: CT of the head was performed without the administration of intravenous contrast. Dose modulation, iterative reconstruction, and/or weight based adjustment of the mA/kV was utilized to reduce the radiation dose to as low as reasonably achievable. COMPARISON: None HISTORY: ORDERING SYSTEM PROVIDED HISTORY: possible stroke Leftward gaze preference. Concern for left MCA stroke. FINDINGS: BRAIN/VENTRICLES: There is no acute intracranial hemorrhage, mass effect or midline shift. No abnormal extra-axial fluid collection. There is hypoattenuation of the left basal ganglia and lateral aspect of the left thalamus There is no evidence of hydrocephalus. Basal cisterns are patent. ORBITS: The visualized portion of the orbits demonstrate no acute abnormality. SINUSES: The visualized paranasal sinuses and mastoid air cells demonstrate no acute abnormality. SOFT TISSUES/SKULL:  No acute abnormality of the visualized skull or soft tissues. Patchy hypoattenuation of the left basal ganglia and lateral aspect of the left thalamus, concerning for acute left MCA infarct. Findings were discussed with Devon Hawthorne at 10:38 a.m. on 1/22/2018.      Cta Neck W Contrast    Result Date: 1/22/2018  EXAMINATION: CTA OF THE NECK; CTA OF THE HEAD WITH CONTRAST 1/22/2018 vertebral artery is mildly dominant. There is mild atherosclerotic narrowing of the left vertebral artery origin. SOFT TISSUES: Visualized lungs demonstrate a small right pleural effusion and associated compressive atelectasis. No cervical or superior mediastinal lymphadenopathy. The visualized portion of the larynx and pharynx appear unremarkable. The parotid and submandibular salivary glands are unremarkable. Heterogeneous thyroid gland. BONES: Bones are osteopenic. No acute osseous abnormality. CTA HEAD: ANTERIOR CIRCULATION: There is absence of contrast related enhancement of the left internal carotid artery and left M1 segment on arterial, venous, and delayed images. There is evidence of good MCA collaterals with visualization of diminutive left M2 and M3 branches on arterial, venous, and delayed phases. Atherosclerotic calcification of the cavernous right internal carotid artery without flow-limiting stenosis. An anterior communicating artery is visualized. Left A1 segment appears hypoplastic. The anterior cerebral and middle cerebral arteries demonstrate no focal stenosis. POSTERIOR CIRCULATION: No flow-limiting stenosis of the intracranial vertebral arteries. No flow-limiting stenosis of the basilar or bilateral posterior cerebral arteries. ANEURYSM: No intracranial aneurysm is seen. BRAIN: No mass effect or midline shift. No abnormal extra-axial fluid collection. Re-demonstration of left basal ganglia and thalamic hypoattenuation, consistent with left MCA infarct. 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 The left internal carotid artery is occluded. Good left MCA collaterals, with visualization of left M2 and M3 branches on arterial, venous, and delayed phases. Mild, less than 50%, stenosis of the right internal carotid artery by NASCET criteria. No additional intracranial flow-limiting stenosis or aneurysm. post angio shows no hemorrhagic conversion  - Goal -140  - Do not bend right leg for 3 hours post- procedure   - Neurovascular checks per protocol  - F/U MRI Brain  - F/U 24 hour post tPA [facility] 1/23 ~1053  - Lipitor 40mg QHS    CARDIOVASCULAR:  - Goal -140  - Hypertensive on arrival to NSICU, placed on cardene gtt and given labetalol 20mg IVP x1  - Continue cardene gtt  - History of afib  - Continue home Toprol XL 25mg QD  - F/U 2D echo    PULMONARY:  - Intubated during angio due to vomiting  - Vent Settings; PRVC 40/16/460/5  - Daily ABG 7.385/44/345.4/26.3  - Copious oral secretions, suction aggressively  - F/U CXR    RENAL/FLUID/ELECTROLYTE:  - BUN 18/ Creatinine . 64  - Keep maldonado for strict I&O  - F/U repeat BMP as POC labs were abnormal compared to previous BMP    GI/NUTRITION:  NUTRITION:  Diet NPO Effective Now  - Pepcid 20mg IVP BID for GI ppx  - Milk of Magnesia PRN for constipation  - Zofran 4mg IVP Q6hPRN for vomiting  - OJ to low/intermittent suction    ID/HEME:  - Monitor for fevers  - No leukocytosis, WBC 8.5  - H&H stable 8.7/29.2  - Platelets 825  - Continue to monitor, daily CBC    ENDOCRINE:  - History of DM 2  - Continue to monitor blood glucose, goal <180  - Start low dose insulin sliding scale    OTHER:  - PT/OT/ST  - Full code  - Follow up on home meds dosage and indication; trileptal, seroquel, venlafaxine     PROPHYLAXIS:  Stress ulcer: H2 blocker    DVT PROPHYLAXIS:  - SCD sleeves - Thigh High   - DARION stockings - Thigh High  - No chemoprophylaxis anticoagulation for 24 hours post tPA administration. DISPOSITION:  [x] To remain ICU for close neurological monitoring and ventilator management. We will continue to follow along. For any changes in exam or patient status please contact Neuro Critical Care.       DARIANA Pedersen  Neurosurgery Resident  Neuro Critical Care  Pager [phone]  1/22/2018     3:25 PM

## 2018-01-22 NOTE — FLOWSHEET NOTE
followed up with the pt and family. Pt is still in her procedure. Aníbal Pouch, pt's daughter shared with  the news that she received from thr doctor. She stated that the doctor found a mass in the veins of the pt that she was able to remove. She stated that the doctor had a very good positive outlook on things. Aníbal Rutledge was very pleased.  informed her that he would pass her case on the the on-coming  for further follow-up. Jimena thanked the  for all his support.

## 2018-01-22 NOTE — FLOWSHEET NOTE
[facility] CARE DEPARTMENT - [address]     Emergency/Trauma Note    PATIENT NAME: Eli Victor    Shift date: 1/22/2018    Shift day: Monday   Shift # 1    Room # JUHI/JUHI   Name: Eli Victor            Age: 76 y.o. Gender: female          Sikh: [address] [place] [address] of Yazidism: Unknown    Trauma/Incident type: Race Alert  Admit Date & Time: 1/22/2018 10:14 AM        PATIENT/EVENT DESCRIPTION:  Eli Victor is a 76 y.o. female who arrived via EMS from her nursing home in [place], [place]. Pt was found lying in her bed with stroke like symptoms. Pt to be admitted to JUHI/JUHI. SPIRITUAL ASSESSMENT/INTERVENTION:  Pt went straight to CT and then to IR.  met with the daughter of the pt, Park Avitia and her daughter Shauna Farrell when they arrived at the ED. Park Avitia was very emotional and tearful asking if her mother was still alive.  told her what he knew at that time and escorted them down to the IR.  got the doctor the give the family and update.  embraced Park Avitia after she recieved the update. Park Avitia stated that she was a [address].  offered prayer and she accepted.  embraced and held Gregor Fisher as he prayed. After prayer, Polly's other daughter, Eve Roque arrived at the waiting room.  stated that he will follow up with them a little later after the procedure. PATIENT BELONGINGS:  With patient    ANY BELONGINGS OF SIGNIFICANT VALUE NOTED:      REGISTRATION STAFF NOTIFIED? Yes      WHAT IS YOUR SPIRITUAL CARE PLAN FOR THIS PATIENT?:   Chelsey Zhu will check of family after the procedure.      01/22/18 1151   Encounter Summary   Services provided to: Family   Referral/Consult From: Multi-disciplinary team   Support System Children;Family members   Place of Anglican +   Continue Visiting (01/22/2018)   Complexity of Encounter High   Length of Encounter 45 minutes   Spiritual Assessment Completed Yes   Crisis   Type Stroke Alert   Assessment

## 2018-01-22 NOTE — ANESTHESIA PRE PROCEDURE
Department of Anesthesiology  Preprocedure Note       Name:  Jr Hills   Age:  76 y.o.  :  1940                                          MRN:  [id number]         Date:  2018      Surgeon Anson Barnard    Procedure: Lt.caroted  neurointervention    Medications prior to admission:   Prior to Admission medications    Medication Sig Start Date End Date Taking?  Authorizing Provider   magnesium hydroxide (MILK OF MAGNESIA) 400 MG/5ML suspension Take 30 mLs by mouth daily as needed for Constipation 17   Mariel Kessler MD   OXcarbazepine (TRILEPTAL) 300 MG tablet Take 1 tablet by mouth 2 times daily 17   Mariel Kessler MD   venlafaxine 225 MG extended release tablet take 1 tablet by mouth every morning 17   Mariel Kessler MD   pioglitazone (ACTOS) 15 MG tablet take 1 tablet by mouth once daily 17   Mariel Kessler MD   atorvastatin (LIPITOR) 20 MG tablet Take 1 tablet by mouth daily 17   Mariel Kessler MD   valsartan (DIOVAN) 160 MG tablet Take 1 tablet by mouth daily 17   Mariel Kessler MD   hydroxyurea (HYDREA) 500 MG chemo capsule 1 tab daily 17   Mariel Kessler MD   hydroxyurea (HYDREA) 500 MG chemo capsule Take 1 capsule by mouth 3 times daily 17   Mariel Kessler MD   QUEtiapine (SEROQUEL) 300 MG tablet take 1 tablet by mouth at bedtime 17   Mariel Kessler MD   metoprolol succinate (TOPROL XL) 25 MG extended release tablet Take 1 tablet by mouth daily 17   Mariel Kessler MD   lidocaine (LIDODERM) 5 % Place 1 patch onto the skin daily 12 hours on, 12 hours off. 17   Mariel Kessler MD   hydrochlorothiazide (HYDRODIURIL) 25 MG tablet Take 1 tablet by mouth daily 17   Mariel Kessler MD   ferrous sulfate 325 (65 Fe) MG EC tablet Take 1 tablet by mouth 3 times daily (with meals) 17   Mariel Kessler MD   omeprazole (PRILOSEC) 20 MG delayed release capsule Take 1 capsule release tablet Take 1 tablet by mouth daily 30 tablet 0    lidocaine (LIDODERM) 5 % Place 1 patch onto the skin daily 12 hours on, 12 hours off. 30 patch 0    hydrochlorothiazide (HYDRODIURIL) 25 MG tablet Take 1 tablet by mouth daily 30 tablet 3    ferrous sulfate 325 (65 Fe) MG EC tablet Take 1 tablet by mouth 3 times daily (with meals) 90 tablet 3    omeprazole (PRILOSEC) 20 MG delayed release capsule Take 1 capsule by mouth Daily 30 capsule 0    oxybutynin (DITROPAN-XL) 5 MG extended release tablet Take 1 tablet by mouth daily 30 tablet 3    acetaminophen (TYLENOL) 500 MG tablet Take 1,000 mg by mouth every 6 hours as needed.  cyanocobalamin 1000 MCG/ML injection Inject 1,000 mcg into the muscle every 30 days. Facility-Administered Medications Ordered in Other Encounters   Medication Dose Route Frequency Provider Last Rate Last Dose    ceFAZolin (ANCEF) 2 g in dextrose 5 % 100 mL IVPB   Intravenous PRN Malachi Veres   2 g at 01/22/18 1128       Allergies:     Allergies   Allergen Reactions    Prozac [Fluoxetine Hcl] Other (See Comments)     'puts me in la-la- land'    Xanax [Alprazolam] Anxiety    Wellbutrin [Bupropion]        Problem List:    Patient Active Problem List   Diagnosis Code    Chronic left shoulder pain M25.512, G89.29    Gastroesophageal reflux disease without esophagitis K21.9    Essential hypertension I10    Controlled type 2 diabetes mellitus without complication, without long-term current use of insulin ([facility] [address] E11.9    Insomnia G47.00    Arthritis of shoulder region, left M19.012    Compression fracture of thoracic spine, non-traumatic, initial encounter ([facility] [address] M48.54XA       Past Medical History:        Diagnosis Date    Arthritis     Cancer ([address]     skin    Diabetes mellitus ([address]     Hyperlipidemia     Hypertension     Psychiatric problem        Past Surgical History:        Procedure Laterality Date    ARM SURGERY      JOINT REPLACEMENT Bilateral     Knees Evaluation  Patient summary reviewed no history of anesthetic complications:   Airway: Mallampati: II  TM distance: >3 FB   Neck ROM: full  Mouth opening: > = 3 FB Dental:    (+) lower dentures and upper dentures      Pulmonary:Negative Pulmonary ROS and normal exam                               Cardiovascular:    (+) hypertension: moderate,       ECG reviewed  Rhythm: regular  Rate: normal                    Neuro/Psych:   (+) psychiatric history: stable with treatment            GI/Hepatic/Renal:   (+) GERD: no interval change,           Endo/Other:    (+) Type II DM, no interval change, , .                 Abdominal:           Vascular:                                        Anesthesia Plan      MAC and general     ASA 4 - emergent       Induction: intravenous. Anesthetic plan and risks discussed with patient. Plan discussed with CRNA.                   Jayesh Robertson MD   1/22/2018

## 2018-01-22 NOTE — OP NOTE
[facility]    NEUROENDOVASCULAR SERVICE: POST-OP NOTE: 1/22/2018    Pt Name: Avani Meza  MRN: [id number]  Jose [place]: 1940  Date of Procedure: 1/22/2018  Primary Care Physician: Alejandrina Daugherty CNP    Pre-Procedural Diagnosis:left CCA occlusion  Post-Procedural Diagnosis:same    Procedure Performed:conventional cerebral angiogram with mechanical thrombectomy and suction of the complete left common carotid, internal, external and middle cerebral arteries    Surgeon:   Jose Phelan MD    1st Assistant:  Donna Blackwell    Fellow:  Puma Watson    PRE-PROCEDURAL EXAM:  MODIFIED ANIBAL SCORE: 5 - Severe disability:  bedridden, incontinent and requiring constant nursing care and attention. Neurological exam performed and unchanged from initial H&P or consult  CT head ASPECT score: 9    Anesthesia: General Anesthesia  Complications: none    Intra-Operative EXAM:  Patient sedated with unchanged limited neurological exam    EBL: < Minimal      Cc            Specimens: Were not Obtained  Contrast:     Visipaque 270 low osmolar 140 Cc             Fluoro: 48.7 min    Findings:  Please see dictated Radiology note for further details  1. 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 2. TICI 2C flow restoration of the left CCA,ICA and ECA with 2 passes of vacuum pump reperfusion from ACE 0.69 and flow gate balloon guided catheter. 3. TICI 2C flow restoration of the left MCA occlusion with 3 passes of mechanical thrombectomy using 4x40 with local aspiration. TICI score: 2c    POST-PROCEDURAL EXAM :   Stable neurological Exam  Patient was still on propofol and exam was limited. Closure:  right Angioseal 8   F        POST-PROCEDURAL MONITORING : see orders  Disposition: Neuro ICU    Recommendations:  1. Admit to NSICU. 2. Do not bend right leg for 3 hours. 3. Groin checks per protocol. 4. Neuro checks per icu protocol.   5. Peripheral

## 2018-01-22 NOTE — ED PROVIDER NOTES
[bupropion]    Home Medications:  Prior to Admission medications    Medication Sig Start Date End Date Taking?  Authorizing Provider   magnesium hydroxide (MILK OF MAGNESIA) 400 MG/5ML suspension Take 30 mLs by mouth daily as needed for Constipation 12/21/17   Deidre Robledo MD   OXcarbazepine (TRILEPTAL) 300 MG tablet Take 1 tablet by mouth 2 times daily 12/21/17   Deidre Robledo MD   venlafaxine 225 MG extended release tablet take 1 tablet by mouth every morning 12/21/17   Deidre Robledo MD   pioglitazone (ACTOS) 15 MG tablet take 1 tablet by mouth once daily 12/21/17   Deidre Robledo MD   atorvastatin (LIPITOR) 20 MG tablet Take 1 tablet by mouth daily 12/21/17   Deidre Robledo MD   valsartan (DIOVAN) 160 MG tablet Take 1 tablet by mouth daily 12/21/17   Deidre Robledo MD   hydroxyurea (HYDREA) 500 MG chemo capsule 1 tab daily 12/21/17   Deidre Robledo MD   hydroxyurea (HYDREA) 500 MG chemo capsule Take 1 capsule by mouth 3 times daily 12/21/17   Deidre Robledo MD   QUEtiapine (SEROQUEL) 300 MG tablet take 1 tablet by mouth at bedtime 12/21/17   Deidre Robledo MD   metoprolol succinate (TOPROL XL) 25 MG extended release tablet Take 1 tablet by mouth daily 12/21/17   Deidre Robledo MD   lidocaine (LIDODERM) 5 % Place 1 patch onto the skin daily 12 hours on, 12 hours off. 12/22/17   Deidre Robledo MD   hydrochlorothiazide (HYDRODIURIL) 25 MG tablet Take 1 tablet by mouth daily 12/21/17   Deidre Robledo MD   ferrous sulfate 325 (65 Fe) MG EC tablet Take 1 tablet by mouth 3 times daily (with meals) 12/21/17   Deidre Robledo MD   omeprazole (PRILOSEC) 20 MG delayed release capsule Take 1 capsule by mouth Daily 12/21/17   Deidre Robledo MD   oxybutynin (DITROPAN-XL) 5 MG extended release tablet Take 1 tablet by mouth daily 12/21/17   Deidre Robledo MD   acetaminophen (TYLENOL) 500 MG tablet Take 1,000 mg by mouth every 6 hours as needed. Historical Provider, MD   cyanocobalamin 1000 MCG/ML injection Inject 1,000 mcg into the muscle every 30 days. Historical Provider, MD       REVIEW OF SYSTEMS    (2-9 systems for level 4, 10 or more for level 5)      Review of Systems   Unable to perform ROS: Mental status change       PHYSICAL EXAM   (up to 7 for level 4, 8 or more for level 5)      INITIAL VITALS:   BP (!) 145/82   Wt 189 lb 9.5 oz (86 kg)   SpO2 96%   BMI 29.69 kg/m²     Physical Exam   Constitutional: She appears well-developed. She appears lethargic. HENT:   Head: Normocephalic and atraumatic. Eyes: Right eye exhibits no discharge. Left eye exhibits no discharge. Right pupil 5 mm and minimally reactive. Left pupil 5 mm and nonreactive. Patient is looking to the left only. Neck: No tracheal deviation present. Cardiovascular: Normal rate, regular rhythm, normal heart sounds and intact distal pulses. Pulmonary/Chest: Effort normal and breath sounds normal. No stridor. No respiratory distress. She has no wheezes. She has no rales. Abdominal: Soft. There is no tenderness. There is no guarding. Musculoskeletal: She exhibits no edema or deformity. Neurological: She appears lethargic. Please see NH stroke scale below for additional information. Patient has full range of motion of her left arm and left leg. Patient withdraws her right leg to pain. Patient has no movement of her right arm despite painful stimuli. Patient is mumbling only at this time, with severe dysarthria and aphasia. Skin: Skin is warm and dry. No rash noted. Psychiatric: She is noncommunicative.        WORK-UP     PLAN (LABS / IMAGING / EKG):  Orders Placed This Encounter   Procedures    CT Head WO Contrast    CTA HEAD W CONTRAST    CTA NECK W CONTRAST    IR ANGIOGRAM CAROTID C EREBRAL BILATERAL    Hemoglobin and hematocrit, blood    SODIUM (POC)    POTASSIUM (POC)    CHLORIDE (POC)    CALCIUM, IONIC (POC)    CBC Auto right internal carotid artery without flow-limiting stenosis. An anterior communicating artery is visualized. Left A1 segment appears hypoplastic. The anterior cerebral and middle cerebral arteries demonstrate no focal stenosis. POSTERIOR CIRCULATION: No flow-limiting stenosis of the intracranial vertebral arteries. No flow-limiting stenosis of the basilar or bilateral posterior cerebral arteries. ANEURYSM: No intracranial aneurysm is seen. BRAIN: No mass effect or midline shift. No abnormal extra-axial fluid collection. Re-demonstration of left basal ganglia and thalamic hypoattenuation, consistent with left MCA infarct. 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 The left internal carotid artery is occluded. Good left MCA collaterals, with visualization of left M2 and M3 branches on arterial, venous, and delayed phases. Mild, less than 50%, stenosis of the right internal carotid artery by NASCET criteria. No additional intracranial flow-limiting stenosis or aneurysm. Hypoattenuation of the left basal ganglia and left thalamus, consistent with acute left MCA infarct. Findings were discussed with Jostin Ayala at 11:12 a.m. and 11:26 a.m. on 1/22/2018. EKG  Rhythm: atrial fibrillation - rapid  Rate: tachycardia  Axis: normal  Ectopy: none  Conduction: normal  ST Segments: no acute change  T Waves: no acute change  Q Waves: none    Clinical Impression: atrial fibrillation w/ RVR, good R-wave progression    Meredith Bourgeois DO    All EKG's are interpreted by  Emergency Department Physician who either signs or Co-signs this chart in the absence of a cardiologist.    DIFFERENTIAL DIAGNOSIS:  CVA, seizure/post-itctal state, hypoglycemia, overdose    EMERGENCY DEPARTMENT COURSE & MDM:  76year old female presents with altered mental status and decreased movement. Vitals within normal limits. No temperature was taken on the patient.

## 2018-01-23 ENCOUNTER — APPOINTMENT (OUTPATIENT)
Dept: GENERAL RADIOLOGY | Age: 78
DRG: 023 | End: 2018-01-23
Payer: MEDICARE

## 2018-01-23 ENCOUNTER — APPOINTMENT (OUTPATIENT)
Dept: MRI IMAGING | Age: 78
DRG: 023 | End: 2018-01-23
Payer: MEDICARE

## 2018-01-23 ENCOUNTER — APPOINTMENT (OUTPATIENT)
Dept: CT IMAGING | Age: 78
DRG: 023 | End: 2018-01-23
Payer: MEDICARE

## 2018-01-23 PROBLEM — I63.512 ACUTE ISCHEMIC LEFT MIDDLE CEREBRAL ARTERY (MCA) STROKE (HCC): Status: ACTIVE | Noted: 2018-01-22

## 2018-01-23 LAB
ABSOLUTE EOS #: 0 K/UL (ref 0–0.4)
ABSOLUTE IMMATURE GRANULOCYTE: 0 K/UL (ref 0–0.3)
ABSOLUTE LYMPH #: 0.73 K/UL (ref 1–4.8)
ABSOLUTE MONO #: 0.79 K/UL (ref 0.1–0.8)
ALLEN TEST: ABNORMAL
ANION GAP SERPL CALCULATED.3IONS-SCNC: 12 MMOL/L (ref 9–17)
BASOPHILS # BLD: 0 % (ref 0–2)
BASOPHILS ABSOLUTE: 0 K/UL (ref 0–0.2)
BLOOD BANK SPECIMEN: NORMAL
BUN BLDV-MCNC: 20 MG/DL (ref 8–23)
BUN/CREAT BLD: ABNORMAL (ref 9–20)
CALCIUM IONIZED: 1.08 MMOL/L (ref 1.13–1.33)
CALCIUM SERPL-MCNC: 7.2 MG/DL (ref 8.6–10.4)
CHLORIDE BLD-SCNC: 104 MMOL/L (ref 98–107)
CHOLESTEROL/HDL RATIO: 5.7
CHOLESTEROL: 125 MG/DL
CO2: 24 MMOL/L (ref 20–31)
CREAT SERPL-MCNC: 0.59 MG/DL (ref 0.5–0.9)
CULTURE: NO GROWTH
CULTURE: NORMAL
DIFFERENTIAL TYPE: ABNORMAL
EKG ATRIAL RATE: 91 BPM
EKG Q-T INTERVAL: 340 MS
EKG QRS DURATION: 76 MS
EKG QTC CALCULATION (BAZETT): 460 MS
EKG R AXIS: 28 DEGREES
EKG T AXIS: 36 DEGREES
EKG VENTRICULAR RATE: 110 BPM
EOSINOPHILS RELATIVE PERCENT: 0 % (ref 1–4)
FIBRINOGEN: 174 MG/DL (ref 140–420)
FIBRINOGEN: 177 MG/DL (ref 140–420)
FIO2: 40
GFR AFRICAN AMERICAN: >60 ML/MIN
GFR NON-AFRICAN AMERICAN: >60 ML/MIN
GFR SERPL CREATININE-BSD FRML MDRD: ABNORMAL ML/MIN/{1.73_M2}
GFR SERPL CREATININE-BSD FRML MDRD: ABNORMAL ML/MIN/{1.73_M2}
GLUCOSE BLD-MCNC: 100 MG/DL (ref 65–105)
GLUCOSE BLD-MCNC: 105 MG/DL (ref 65–105)
GLUCOSE BLD-MCNC: 118 MG/DL (ref 65–105)
GLUCOSE BLD-MCNC: 119 MG/DL (ref 65–105)
GLUCOSE BLD-MCNC: 142 MG/DL (ref 65–105)
GLUCOSE BLD-MCNC: 148 MG/DL (ref 70–99)
GLUCOSE BLD-MCNC: 41 MG/DL (ref 65–105)
GLUCOSE BLD-MCNC: 61 MG/DL (ref 65–105)
HCT VFR BLD CALC: 22.3 % (ref 36.3–47.1)
HCT VFR BLD CALC: 24 % (ref 36.3–47.1)
HCT VFR BLD CALC: 30.2 % (ref 36.3–47.1)
HDLC SERPL-MCNC: 22 MG/DL
HEMOGLOBIN: 6.8 G/DL (ref 11.9–15.1)
HEMOGLOBIN: 7.4 G/DL (ref 11.9–15.1)
HEMOGLOBIN: 9.2 G/DL (ref 11.9–15.1)
IMMATURE GRANULOCYTES: 0 %
LDL CHOLESTEROL: 75 MG/DL (ref 0–130)
LV EF: 55 %
LVEF MODALITY: NORMAL
LYMPHOCYTES # BLD: 11 % (ref 24–44)
Lab: NORMAL
Lab: NORMAL
MAGNESIUM: 1.7 MG/DL (ref 1.6–2.6)
MCH RBC QN AUTO: 36.1 PG (ref 25.2–33.5)
MCHC RBC AUTO-ENTMCNC: 30.8 G/DL (ref 28.4–34.8)
MCV RBC AUTO: 117.1 FL (ref 82.6–102.9)
MODE: ABNORMAL
MONOCYTES # BLD: 12 % (ref 1–7)
MORPHOLOGY: ABNORMAL
NEGATIVE BASE EXCESS, ART: ABNORMAL (ref 0–2)
NRBC AUTOMATED: 0 PER 100 WBC
NUCLEATED RED BLOOD CELLS: 1 PER 100 WBC
O2 DEVICE/FLOW/%: ABNORMAL
PATIENT TEMP: ABNORMAL
PDW BLD-RTO: ABNORMAL % (ref 11.8–14.4)
PHOSPHORUS: 2.9 MG/DL (ref 2.6–4.5)
PLATELET # BLD: 382 K/UL (ref 138–453)
PLATELET ESTIMATE: ABNORMAL
PMV BLD AUTO: 9.5 FL (ref 8.1–13.5)
POC HCO3: 24.8 MMOL/L (ref 21–28)
POC O2 SATURATION: 99 % (ref 94–98)
POC PCO2 TEMP: ABNORMAL MM HG
POC PCO2: 37.3 MM HG (ref 35–48)
POC PH TEMP: ABNORMAL
POC PH: 7.43 (ref 7.35–7.45)
POC PO2 TEMP: ABNORMAL MM HG
POC PO2: 125.1 MM HG (ref 83–108)
POSITIVE BASE EXCESS, ART: 0 (ref 0–3)
POTASSIUM SERPL-SCNC: 3.7 MMOL/L (ref 3.7–5.3)
RBC # BLD: 2.05 M/UL (ref 3.95–5.11)
RBC # BLD: ABNORMAL 10*6/UL
SAMPLE SITE: ABNORMAL
SEG NEUTROPHILS: 77 % (ref 36–66)
SEGMENTED NEUTROPHILS ABSOLUTE COUNT: 5.08 K/UL (ref 1.8–7.7)
SODIUM BLD-SCNC: 140 MMOL/L (ref 135–144)
SPECIMEN DESCRIPTION: NORMAL
SPECIMEN DESCRIPTION: NORMAL
STATUS: NORMAL
STATUS: NORMAL
TCO2 (CALC), ART: 26 MMOL/L (ref 22–29)
TRIGL SERPL-MCNC: 140 MG/DL
VLDLC SERPL CALC-MCNC: ABNORMAL MG/DL (ref 1–30)
WBC # BLD: 6.6 K/UL (ref 3.5–11.3)
WBC # BLD: ABNORMAL 10*3/UL

## 2018-01-23 PROCEDURE — 6370000000 HC RX 637 (ALT 250 FOR IP): Performed by: PSYCHIATRY & NEUROLOGY

## 2018-01-23 PROCEDURE — 85018 HEMOGLOBIN: CPT

## 2018-01-23 PROCEDURE — 94003 VENT MGMT INPAT SUBQ DAY: CPT

## 2018-01-23 PROCEDURE — 85384 FIBRINOGEN ACTIVITY: CPT

## 2018-01-23 PROCEDURE — 86920 COMPATIBILITY TEST SPIN: CPT

## 2018-01-23 PROCEDURE — P9016 RBC LEUKOCYTES REDUCED: HCPCS

## 2018-01-23 PROCEDURE — 80048 BASIC METABOLIC PNL TOTAL CA: CPT

## 2018-01-23 PROCEDURE — 36430 TRANSFUSION BLD/BLD COMPNT: CPT

## 2018-01-23 PROCEDURE — 82947 ASSAY GLUCOSE BLOOD QUANT: CPT

## 2018-01-23 PROCEDURE — 6370000000 HC RX 637 (ALT 250 FOR IP): Performed by: NURSE PRACTITIONER

## 2018-01-23 PROCEDURE — 70450 CT HEAD/BRAIN W/O DYE: CPT

## 2018-01-23 PROCEDURE — 85014 HEMATOCRIT: CPT

## 2018-01-23 PROCEDURE — 99233 SBSQ HOSP IP/OBS HIGH 50: CPT | Performed by: PSYCHIATRY & NEUROLOGY

## 2018-01-23 PROCEDURE — 6360000002 HC RX W HCPCS: Performed by: NURSE PRACTITIONER

## 2018-01-23 PROCEDURE — 80061 LIPID PANEL: CPT

## 2018-01-23 PROCEDURE — 93306 TTE W/DOPPLER COMPLETE: CPT

## 2018-01-23 PROCEDURE — 94762 N-INVAS EAR/PLS OXIMTRY CONT: CPT

## 2018-01-23 PROCEDURE — 6360000002 HC RX W HCPCS: Performed by: EMERGENCY MEDICINE

## 2018-01-23 PROCEDURE — 85025 COMPLETE CBC W/AUTO DIFF WBC: CPT

## 2018-01-23 PROCEDURE — 94770 HC ETCO2 MONITOR DAILY: CPT

## 2018-01-23 PROCEDURE — 82803 BLOOD GASES ANY COMBINATION: CPT

## 2018-01-23 PROCEDURE — 2500000003 HC RX 250 WO HCPCS: Performed by: NURSE PRACTITIONER

## 2018-01-23 PROCEDURE — 2000000003 HC NEURO ICU R&B

## 2018-01-23 PROCEDURE — 70551 MRI BRAIN STEM W/O DYE: CPT

## 2018-01-23 PROCEDURE — 2580000003 HC RX 258: Performed by: NURSE PRACTITIONER

## 2018-01-23 PROCEDURE — 86900 BLOOD TYPING SEROLOGIC ABO: CPT

## 2018-01-23 PROCEDURE — 6370000000 HC RX 637 (ALT 250 FOR IP): Performed by: EMERGENCY MEDICINE

## 2018-01-23 PROCEDURE — 86850 RBC ANTIBODY SCREEN: CPT

## 2018-01-23 PROCEDURE — 86901 BLOOD TYPING SEROLOGIC RH(D): CPT

## 2018-01-23 PROCEDURE — 36415 COLL VENOUS BLD VENIPUNCTURE: CPT

## 2018-01-23 PROCEDURE — 99221 1ST HOSP IP/OBS SF/LOW 40: CPT | Performed by: PHYSICAL MEDICINE & REHABILITATION

## 2018-01-23 PROCEDURE — 71045 X-RAY EXAM CHEST 1 VIEW: CPT

## 2018-01-23 PROCEDURE — 2500000003 HC RX 250 WO HCPCS: Performed by: EMERGENCY MEDICINE

## 2018-01-23 PROCEDURE — 99291 CRITICAL CARE FIRST HOUR: CPT | Performed by: PSYCHIATRY & NEUROLOGY

## 2018-01-23 PROCEDURE — 82330 ASSAY OF CALCIUM: CPT

## 2018-01-23 PROCEDURE — 2580000003 HC RX 258: Performed by: EMERGENCY MEDICINE

## 2018-01-23 PROCEDURE — 83735 ASSAY OF MAGNESIUM: CPT

## 2018-01-23 PROCEDURE — 84100 ASSAY OF PHOSPHORUS: CPT

## 2018-01-23 RX ORDER — VENLAFAXINE HYDROCHLORIDE 225 MG/1
112.5 TABLET, EXTENDED RELEASE ORAL
Status: DISCONTINUED | OUTPATIENT
Start: 2018-01-24 | End: 2018-01-23

## 2018-01-23 RX ORDER — SODIUM CHLORIDE 0.9 % (FLUSH) 0.9 %
10 SYRINGE (ML) INJECTION PRN
Status: DISCONTINUED | OUTPATIENT
Start: 2018-01-23 | End: 2018-01-26

## 2018-01-23 RX ORDER — 0.9 % SODIUM CHLORIDE 0.9 %
250 INTRAVENOUS SOLUTION INTRAVENOUS ONCE
Status: DISCONTINUED | OUTPATIENT
Start: 2018-01-23 | End: 2018-01-26

## 2018-01-23 RX ORDER — OXYBUTYNIN CHLORIDE 5 MG/1
5 TABLET, EXTENDED RELEASE ORAL DAILY
Status: DISCONTINUED | OUTPATIENT
Start: 2018-01-23 | End: 2018-01-23

## 2018-01-23 RX ORDER — 0.9 % SODIUM CHLORIDE 0.9 %
500 INTRAVENOUS SOLUTION INTRAVENOUS ONCE
Status: COMPLETED | OUTPATIENT
Start: 2018-01-23 | End: 2018-01-23

## 2018-01-23 RX ORDER — SODIUM CHLORIDE 0.9 % (FLUSH) 0.9 %
10 SYRINGE (ML) INJECTION EVERY 12 HOURS
Status: DISCONTINUED | OUTPATIENT
Start: 2018-01-23 | End: 2018-01-26

## 2018-01-23 RX ORDER — OXCARBAZEPINE 300 MG/1
300 TABLET, FILM COATED ORAL 2 TIMES DAILY
Status: DISCONTINUED | OUTPATIENT
Start: 2018-01-23 | End: 2018-01-24

## 2018-01-23 RX ORDER — HYDRALAZINE HYDROCHLORIDE 20 MG/ML
10 INJECTION INTRAMUSCULAR; INTRAVENOUS EVERY 6 HOURS PRN
Status: DISCONTINUED | OUTPATIENT
Start: 2018-01-23 | End: 2018-01-24

## 2018-01-23 RX ORDER — VENLAFAXINE HYDROCHLORIDE 225 MG/1
225 TABLET, EXTENDED RELEASE ORAL
Status: DISCONTINUED | OUTPATIENT
Start: 2018-01-24 | End: 2018-01-23

## 2018-01-23 RX ORDER — FUROSEMIDE 10 MG/ML
20 INJECTION INTRAMUSCULAR; INTRAVENOUS ONCE
Status: COMPLETED | OUTPATIENT
Start: 2018-01-23 | End: 2018-01-23

## 2018-01-23 RX ORDER — MAGNESIUM SULFATE 1 G/100ML
2 INJECTION INTRAVENOUS ONCE
Status: COMPLETED | OUTPATIENT
Start: 2018-01-23 | End: 2018-01-23

## 2018-01-23 RX ORDER — OXYBUTYNIN CHLORIDE 5 MG/5ML
2.5 SYRUP ORAL 2 TIMES DAILY
Status: DISCONTINUED | OUTPATIENT
Start: 2018-01-23 | End: 2018-01-26

## 2018-01-23 RX ORDER — FUROSEMIDE 10 MG/ML
40 INJECTION INTRAMUSCULAR; INTRAVENOUS ONCE
Status: COMPLETED | OUTPATIENT
Start: 2018-01-23 | End: 2018-01-23

## 2018-01-23 RX ADMIN — Medication 2.5 MG: at 20:58

## 2018-01-23 RX ADMIN — Medication 8 MG/HR: at 22:29

## 2018-01-23 RX ADMIN — LABETALOL HYDROCHLORIDE 10 MG: 5 INJECTION, SOLUTION INTRAVENOUS at 04:40

## 2018-01-23 RX ADMIN — ESOMEPRAZOLE SODIUM 80 MG: 40 INJECTION INTRAVENOUS at 02:21

## 2018-01-23 RX ADMIN — METOPROLOL TARTRATE 12.5 MG: 25 TABLET ORAL at 20:25

## 2018-01-23 RX ADMIN — Medication 8 MG/HR: at 02:55

## 2018-01-23 RX ADMIN — OXCARBAZEPINE 300 MG: 300 TABLET, FILM COATED ORAL at 20:26

## 2018-01-23 RX ADMIN — Medication 0.5 MCG/KG/HR: at 22:29

## 2018-01-23 RX ADMIN — Medication 0.6 MCG/KG/HR: at 06:55

## 2018-01-23 RX ADMIN — CALCIUM GLUCONATE 2 G: 98 INJECTION, SOLUTION INTRAVENOUS at 08:56

## 2018-01-23 RX ADMIN — FUROSEMIDE 20 MG: 10 INJECTION, SOLUTION INTRAMUSCULAR; INTRAVENOUS at 22:39

## 2018-01-23 RX ADMIN — NICARDIPINE HYDROCHLORIDE 5 MG/HR: 0.1 INJECTION, SOLUTION INTRAVENOUS at 06:17

## 2018-01-23 RX ADMIN — MAGNESIUM SULFATE HEPTAHYDRATE 2 G: 1 INJECTION, SOLUTION INTRAVENOUS at 09:36

## 2018-01-23 RX ADMIN — DOCUSATE SODIUM 100 MG: 50 LIQUID ORAL at 20:58

## 2018-01-23 RX ADMIN — ATORVASTATIN CALCIUM 40 MG: 40 TABLET, FILM COATED ORAL at 20:25

## 2018-01-23 RX ADMIN — HYDRALAZINE HYDROCHLORIDE 10 MG: 20 INJECTION INTRAMUSCULAR; INTRAVENOUS at 05:57

## 2018-01-23 RX ADMIN — METOPROLOL SUCCINATE 25 MG: 25 TABLET, FILM COATED, EXTENDED RELEASE ORAL at 08:54

## 2018-01-23 RX ADMIN — FUROSEMIDE 40 MG: 10 INJECTION, SOLUTION INTRAMUSCULAR; INTRAVENOUS at 12:00

## 2018-01-23 RX ADMIN — SODIUM CHLORIDE 500 ML: 9 INJECTION, SOLUTION INTRAVENOUS at 03:30

## 2018-01-23 ASSESSMENT — PULMONARY FUNCTION TESTS
PIF_VALUE: 18
PIF_VALUE: 12
PIF_VALUE: 19
PIF_VALUE: 17
PIF_VALUE: 8
PIF_VALUE: 19
PIF_VALUE: 17
PIF_VALUE: 18
PIF_VALUE: 18
PIF_VALUE: 13
PIF_VALUE: 17
PIF_VALUE: 15
PIF_VALUE: 17
PIF_VALUE: 14
PIF_VALUE: 19
PIF_VALUE: 18
PIF_VALUE: 18

## 2018-01-23 NOTE — PROGRESS NOTES
Daily Progress Note  Neuro Critical Care    Patient Name: Yeyo Dominguez  Patient : 1940  Room/Bed: [facility]/0513-01  Allergies: Allergies   Allergen Reactions    Prozac [Fluoxetine Hcl] Other (See Comments)     'puts me in [place]'    Xanax [Alprazolam] Anxiety    Wellbutrin [Bupropion]      Problem List:   Patient Active Problem List   Diagnosis    Chronic left shoulder pain    Gastroesophageal reflux disease without esophagitis    Essential hypertension    Controlled type 2 diabetes mellitus without complication, without long-term current use of insulin ([address]    Insomnia    Arthritis of shoulder region, left    Compression fracture of thoracic spine, non-traumatic, initial encounter ([address]    Acute CVA (cerebrovascular accident) [facility])       INTERVAL HISTORY      Patient is a 77 yo female with a history of Afib, HTN, HLD, DM 2 who presented to the ED on 18 via EMS from [facility]. Per records patient woke up normal this morning and then around 9:15am started to have mental status changes. Per NAN in the ED patient was unresponsive with right upper extremity weakness with no movement to pain and right lower extremity weakness, flex/withdraw from pain. Left pupil was 5mm, non-reactive and right pupil was 5mm minimally reactive. NIH was 26. CTH showed acute left MCA infarct. CTAH/N showed occlusion of the left common carotid artery. tPA bolus administered at 1053 AM.  Patient was taken emergently to angio where she underwent mechanical thrombectomy of the left CCA, ICA, and ECA as well as the left MCA. Patient began to vomit while on the table after groin puncture and was intubated by anesthesia.       Post procedure patient arrived to NSICU on propofol at 75mcg/kg/hr. She was hypertensive with SBP in the 180's. Cardene gtt was started and titrated up to max dose but SBP remained in the 160's. Blood pressure improved with SBP in the  range with Labetalol 20mg IVP x1. left MCA infarct. 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 The left internal carotid artery is occluded. Good left MCA collaterals, with visualization of left M2 and M3 branches on arterial, venous, and delayed phases. Mild, less than 50%, stenosis of the right internal carotid artery by NASCET criteria. No additional intracranial flow-limiting stenosis or aneurysm. Hypoattenuation of the left basal ganglia and left thalamus, consistent with acute left MCA infarct. Findings were discussed with Annetta Mendoza at 11:12 a.m. and 11:26 a.m. on 1/22/2018. Cta Head W Contrast    Result Date: 1/23/2018  EXAMINATION: CTA OF THE NECK; CTA OF THE HEAD WITH CONTRAST 1/22/2018 11:01 am: TECHNIQUE: CTA of the neck was performed with the administration of intravenous contrast. Multiplanar reformatted images are provided for review. MIP images are provided for review. Stenosis of the internal carotid arteries measured using NASCET criteria. Dose modulation, iterative reconstruction, and/or weight based adjustment of the mA/kV was utilized to reduce the radiation dose to as low as reasonably achievable.; CTA of the head/brain was performed with the administration of intravenous contrast. Multiplanar reformatted images are provided for review. MIP images are provided for review. Dose modulation, iterative reconstruction, and/or weight based adjustment of the mA/kV was utilized to reduce the radiation dose to as low as reasonably achievable. 3D reconstructed images were performed on a separate workstation and provided for review. COMPARISON: Noncontrast CT head performed earlier the same day. HISTORY: ORDERING SYSTEM PROVIDED HISTORY: concern for stroke FINDINGS: CTA NECK: AORTIC ARCH/ARCH VESSELS: There is a 4 vessel aortic arch with separate arch origin of the left vertebral artery.   Atherosclerotic calcification of the aortic arch and proximal arch to verbal stimulation. Does not follow commands. HEAD:  normocephalic, atraumatic    EYES:  PERRL, Left gaze preference, does not cross midline   ENT:  moist mucous membranes   NECK:  supple, symmetric   LUNGS:  Equal air entry bilaterally, scattered rhonchi, improved from yesterday   CARDIOVASCULAR:  normal s1 / s2, RRR, distal pulses intact, no hematoma over right groin, right AC and left wrist edema stable r/t infiltrated IVs   ABDOMEN:  Soft, no rigidity, normal bowel sounds, No OJ output   NEUROLOGIC:  Mental Status:  Intubated, on precedex. Opens eyes to verbal stimulation. Does not follow commands. Cranial Nerves:    II: Visual fields:  abnormal - right hemianopsia  III: Pupils:    III,IV,VI: Extra Ocular Movements: abnormal, left gaze preference, does not cross midline  V: Corneal reflex:  completed. intact  VII: Facial strength: abnormal, intubated, difficult to assess, right facial droop    Motor Exam:    Moves left upper extremity spontaneously, localizes  Moves left lower extremity spontaneously  No movement to pain in right upper extremity  Moves right lower extremity spontaneously   NIH Stroke Scale Total (if not done complete detailed one below):    1a.  Level of consciousness:  2 - not alert, requires repeated stimulation to attend, or is obtunded and requires strong or painful stimulation to make movements (not stereotyped)   1b. Level of consciousness questions:  2 - answers neither question correctly  1c. Level of consciousness questions:  2 - performs neither task correctly  2. Best Gaze:  2 - forced deviation, or total gaze paresis not overcome by oculocephalic maneuver  3. Visual:  2 - complete hemianopia  4. Facial Palsy:  1 - minor paralysis (flattened nasolabial fold, asymmetric on smiling)  5a. Motor left arm:  1 - drift, limb holds 90 (or 45) degrees but drifts down before full 10 seconds: does not hit bed  5b. Motor right arm:  4 - no movement  6a.   Motor

## 2018-01-23 NOTE — PROGRESS NOTES
Pt to MRI with RN and RT. Pt placed on monitor and transport vent for MRI.   Pt tolerated well and will cont to monitor

## 2018-01-23 NOTE — PLAN OF CARE
Problem: MECHANICAL VENTILATION  Goal: Patient will maintain patent airway  Outcome: Ongoing    Goal: Oral health is maintained or improved  Outcome: Ongoing    Goal: ET tube will be managed safely  Outcome: Ongoing    Goal: Ability to express needs and understand communication  Outcome: Ongoing      Problem: COMMUNICATION IMPAIRMENT  Goal: Ability to express needs and understand communication  Outcome: Ongoing      Problem: OXYGENATION/RESPIRATORY FUNCTION  Goal: Patient will maintain patent airway  Outcome: Ongoing    Goal: Patient will achieve/maintain normal respiratory rate/effort  Respiratory rate and effort will be within normal limits for the patient  Outcome: Ongoing      Problem: SKIN INTEGRITY  Goal: Skin integrity is maintained or improved  Outcome: Ongoing

## 2018-01-23 NOTE — CONSULTS
Consult to Physical Medicine Rehab  Consult performed by: Sis Youngblood ordered by: Rosa Isela Melendrez         Physical Medicine & Rehabilitation  Consult Note      Admitting Physician:   Sade Cueto MD    Primary Care Provider:   Jennifer Huber CNP     Reason for Consult:  Acute Inpatient Rehabilitation    Chief Complaint: Right-sided weakness    History of Present Illness:  Other: Dr. Soraya Mckinley is requesting an evaluation for appropriate placement upon discharge from acute care. History from chart review and patient's daughter at bedside. Patient is intubated. Zee Hamm is a 76 y.o. right handed female admitted to [place] [address] on 1/22/2018. According to the daughter the patient was previously healthy living alone in a home when she fell in December and fractured her vertebrae in her back. The family moved the patient to a nursing home in [place] to be closer to family(the patient lives out of [facility] and [place] While at [facility] patient developed shortness of breath was taken to [facility] and diagnosed with a GI bleed, pneumonia, and a pleural effusion. She was hospitalized for approximately 4 days and discharged back to [facility] in [place]. It was 0 to the patient developed right-sided weakness and was brought to the hospital for further workup. CT head showed patchy hypodensities in the left basal ganglia and thalamus. CTA head and neck showed proximal left CCA occlusion. Patient underwent cerebral angiogram with mechanical thrombectomy of left CCA/ICA/ECA and MCA by Dr. Soraya Mckinley on 1/22/2018. Patient has a past medical history of depression, atrial fibrillation not on anticoagulation, diabetes, hypertension.     Review of Systems:  Constitutional: negative for anorexia, chills, fatigue, fevers, sweats and weight loss  Eyes: negative for redness and visual disturbance  Ears, nose, mouth, throat, and face: negative for earaches, sore throat and tinnitus  Respiratory: negative for cough and shortness of breath  Cardiovascular: negative for chest pain, dyspnea and palpitations  Gastrointestinal: negative for abdominal pain, change in bowel habits, constipation, nausea and vomiting  Genitourinary:negative for dysuria, frequency, hesitancy and urinary incontinence  Integument/breast: negative for pruritus and rash  Musculoskeletal:negative for muscle weakness and stiff joints  Neurological: negative for dizziness, headaches and weakness  Behavioral/Psych: negative for decreased appetite, depression and fatigue     Premorbid function:  Independent with all prior to admission per daughter's report. Did not use an assistive device. Current function:    PT:  Patient is intubated. OT: Patient is intubated. ST: Patient is intubated. Past Medical History:        Diagnosis Date    Arthritis     [facility]     skin    Diabetes mellitus ([facility]     Hyperlipidemia     Hypertension     Psychiatric problem        Past Surgical History:        Procedure Laterality Date    ARM SURGERY      JOINT REPLACEMENT Bilateral     Knees    OVARY REMOVAL Left        Allergies:     Allergies   Allergen Reactions    Prozac [Fluoxetine Hcl] Other (See Comments)     'puts me in [place]'    Xanax [Alprazolam] Anxiety    Wellbutrin [Bupropion]         Current Medications:   Current Facility-Administered Medications: esomeprazole (NEXIUM) 80 mg in sodium chloride 0.9 % 100 mL infusion, 8 mg/hr, Intravenous, Continuous  0.9 % sodium chloride bolus, 250 mL, Intravenous, Once  sodium chloride flush 0.9 % injection 10 mL, 10 mL, Intravenous, Q12H  sodium chloride flush 0.9 % injection 10 mL, 10 mL, Intravenous, PRN  hydrALAZINE (APRESOLINE) injection 10 mg, 10 mg, Intravenous, Q6H PRN  furosemide (LASIX) injection 40 mg, 40 mg, Intravenous, Once  metoprolol tartrate (LOPRESSOR) tablet 12.5 mg, 12.5 mg, Per G Tube, BID  alteplase (ACTIVASE) injection 7.7 mg, 0.09 mg/kg, Intravenous, Once and sequela of chronic microvascular ischemic   changes. The findings were sent to the Radiology Results [address] at 11:31   am on 1/23/2018 to be communicated to a licensed caregiver. Diagnostics:    CBC:   Recent Labs      01/22/18   1043  01/22/18   1637  01/23/18   0122  01/23/18   0557   WBC  6.5  8.5   --   6.6   RBC  2.49*  2.37*   --   2.05*   HGB  9.2*  8.7*  6.8*  7.4*   HCT  30.2*  29.2*  22.3*  24.0*   MCV  121.3*  123.2*   --   117.1*   RDW  NOT REPORTED  NOT REPORTED   --   NOT REPORTED   PLT  647*  621*   --   382     BMP:   Recent Labs      01/22/18   1043  01/22/18   1346  01/22/18   1637  01/23/18   0557   NA  137   --   140  140   K  3.8   --   3.8  3.7   CL  97*   --   100  104   CO2  25   --   19*  24   PHOS   --    --   3.2  2.9   BUN  18   --   18  20   CREATININE  0.56  <0.30*  0.45*  0.59     BNP: No results for input(s): BNP in the last 72 hours. PT/INR: No results for input(s): PROTIME, INR in the last 72 hours. APTT: No results for input(s): APTT in the last 72 hours. CARDIAC ENZYMES:   Recent Labs      01/22/18   1043  01/22/18   1637   TROPONINT  0.10*  0.13*     FASTING LIPID PANEL:  Lab Results   Component Value Date    CHOL 125 01/23/2018    HDL 22 (L) 01/23/2018    TRIG 140 01/23/2018     LIVER PROFILE:   Recent Labs      01/22/18   1043   AST  40*   ALT  50*   BILITOT  0.58   ALKPHOS  389*          Physical Exam:  BP (!) 74/52   Pulse 70   Temp 97.9 °F (36.6 °C) (Core)   Resp 14   Ht 5' 5\" (1.651 m)   Wt 189 lb (85.7 kg)   SpO2 100%   BMI 31.45 kg/m²     Intubated  Lungs clear, no wheezing. Heart regular. Abdomen non-distended, non-tender, soft. No calf tenderness or edema. MSR- deferred  Sensory: Unable to assess. Motor: Unable to assess at this time as patient is intubated and sedated. Impression:  Patient is a 68-year-old female with stroke status post mechanical thrombectomy     Recommendations:  1. The patient is currently intubated.

## 2018-01-23 NOTE — PROGRESS NOTES
Physical Therapy  DATE: 2018    NAME: Zee Hamm  MRN: [id number]   : 1940    Patient not seen this date for Physical Therapy due to:  [] Blood transfusion in progress  [] Hemodialysis  []  Patient Declined  [] Spine Precautions   [] Strict Bedrest  [] Surgery/ Procedure  [] Testing      [x] Other- RN cx, pt not appropriate for PT at this time. PT will check back 18. [] PT being discontinued at this time. Patient independent. No further needs. [] PT being discontinued at this time as the patient has been transferred to palliative care. No further needs.     Michelle Knapp, PT

## 2018-01-23 NOTE — PROGRESS NOTES
patch onto the skin daily 12 hours on, 12 hours off. 12/22/17   Pedrito Lund MD   hydrochlorothiazide (HYDRODIURIL) 25 MG tablet Take 1 tablet by mouth daily 12/21/17   Pedrito Lund MD   ferrous sulfate 325 (65 Fe) MG EC tablet Take 1 tablet by mouth 3 times daily (with meals) 12/21/17   Pedrito Lund MD   omeprazole (PRILOSEC) 20 MG delayed release capsule Take 1 capsule by mouth Daily 12/21/17   Pedrito Lund MD   oxybutynin (DITROPAN-XL) 5 MG extended release tablet Take 1 tablet by mouth daily 12/21/17   Pedrito Lund MD   acetaminophen (TYLENOL) 500 MG tablet Take 1,000 mg by mouth every 6 hours as needed. Historical Provider, MD   cyanocobalamin 1000 MCG/ML injection Inject 1,000 mcg into the muscle every 30 days. Historical Provider, MD      OBJECTIVE:   Vitals: BP (!) 138/42   Pulse 54   Temp 97.9 °F (36.6 °C) (Oral)   Resp 16   Ht 5' 5\" (1.651 m)   Wt 189 lb (85.7 kg)   SpO2 100%   BMI 31.45 kg/m²       Does not follow commands. Does not open eyes to painful stimuli  Left gaze preference  CN II-XII :significant for right facial droop  Pupils 3mm on right, 4 mm on left (slugish reaction)  Intact cough/gag/corneal reflexes. Normal tone in four extremities. Sensory/motor exam:purposeful left hemibody, right leg flicker to pain, right arm plegic  DTRs : +1 in bilateral biceps and knees. No nystagmus. Lungs sounds clear to auscultation bilaterally. Heart exam: normal S1,S2 sounds,RRR,no murmers. Abdomen was soft, NT,ND with positive bowel sounds. Normal bilateral radial and pedal pulses.        LABS:     CBC:   Lab Results   Component Value Date    WBC 6.6 01/23/2018    RBC 2.05 01/23/2018    HGB 7.4 01/23/2018    HCT 24.0 01/23/2018    .1 01/23/2018    MCH 36.1 01/23/2018    MCHC 30.8 01/23/2018    RDW Pending 01/23/2018     01/23/2018    MPV 9.5 01/23/2018     BMP:    Lab Results   Component Value Date     01/22/2018    K 3.8

## 2018-01-23 NOTE — FLOWSHEET NOTE
Stopped to see pt per referral from [employer] . Pt had been to IR for a procedure & was now on the floor w/ her daughter Selin Lee & gr-evelina Terrazas @ bedside. Pt was still somewhat sleepy. Family indicated that the procedure had gone well. Family accepted 's offer of prayer for pt. Chaplains will remain available to offer spiritual and emotional support as needed. Rev. Bairon Rea, [address]     01/22/18 9435   Encounter Summary   Services provided to: Patient and family together  (daughter Selin Lee & g-crystal Terrazas @ bedside)   Referral/Consult From: Other    Support System Children;Family members   Place of Episcopal none since moving to  [place] 68 (1/22)   Complexity of Encounter Moderate   Length of Encounter 15 minutes   Routine   Type Follow up   Assessment Calm; Approachable; Anxious; Tearful; Hopeful;Coping  (pt intubated; assessment mostly from family)   Intervention Sustaining presence/ Ministry of presence; Active listening;Explored feelings, thoughts, concerns;Explored coping resources; Discussed belief system/Catholic practices/gloria;Discussed illness/injury and it's impact;Nurtured hope;Prayer  (left Spiritual Care info on tray table)   Outcome Receptive; Acceptance; Coping;Encouraged; Hopeful;Comfort;Expressed gratitude   Spiritual/Scientologist   Type Spiritual support

## 2018-01-24 ENCOUNTER — APPOINTMENT (OUTPATIENT)
Dept: GENERAL RADIOLOGY | Age: 78
DRG: 023 | End: 2018-01-24
Payer: MEDICARE

## 2018-01-24 ENCOUNTER — APPOINTMENT (OUTPATIENT)
Dept: ULTRASOUND IMAGING | Age: 78
DRG: 023 | End: 2018-01-24
Payer: MEDICARE

## 2018-01-24 LAB
ABO/RH: NORMAL
ABSOLUTE EOS #: <0.03 K/UL (ref 0–0.44)
ABSOLUTE IMMATURE GRANULOCYTE: 0.4 K/UL (ref 0–0.3)
ABSOLUTE LYMPH #: 0.98 K/UL (ref 1.1–3.7)
ABSOLUTE MONO #: 0.91 K/UL (ref 0.1–1.2)
ALLEN TEST: POSITIVE
ANION GAP SERPL CALCULATED.3IONS-SCNC: 11 MMOL/L (ref 9–17)
ANTIBODY SCREEN: NEGATIVE
ARM BAND NUMBER: NORMAL
BASOPHILS # BLD: 0 % (ref 0–2)
BASOPHILS ABSOLUTE: <0.03 K/UL (ref 0–0.2)
BLD PROD TYP BPU: NORMAL
BLD PROD TYP BPU: NORMAL
BUN BLDV-MCNC: 18 MG/DL (ref 8–23)
BUN/CREAT BLD: ABNORMAL (ref 9–20)
CALCIUM IONIZED: 1.16 MMOL/L (ref 1.13–1.33)
CALCIUM SERPL-MCNC: 8.4 MG/DL (ref 8.6–10.4)
CHLORIDE BLD-SCNC: 110 MMOL/L (ref 98–107)
CO2: 22 MMOL/L (ref 20–31)
CREAT SERPL-MCNC: 0.53 MG/DL (ref 0.5–0.9)
CROSSMATCH RESULT: NORMAL
CROSSMATCH RESULT: NORMAL
DIFFERENTIAL TYPE: ABNORMAL
DISPENSE STATUS BLOOD BANK: NORMAL
DISPENSE STATUS BLOOD BANK: NORMAL
EOSINOPHILS RELATIVE PERCENT: 0 % (ref 1–4)
EXPIRATION DATE: NORMAL
FIO2: 30
GFR AFRICAN AMERICAN: >60 ML/MIN
GFR NON-AFRICAN AMERICAN: >60 ML/MIN
GFR SERPL CREATININE-BSD FRML MDRD: ABNORMAL ML/MIN/{1.73_M2}
GFR SERPL CREATININE-BSD FRML MDRD: ABNORMAL ML/MIN/{1.73_M2}
GLUCOSE BLD-MCNC: 110 MG/DL (ref 70–99)
GLUCOSE BLD-MCNC: 117 MG/DL (ref 74–100)
GLUCOSE BLD-MCNC: 118 MG/DL (ref 65–105)
GLUCOSE BLD-MCNC: 147 MG/DL (ref 65–105)
GLUCOSE BLD-MCNC: 98 MG/DL (ref 65–105)
HCT VFR BLD CALC: 30.7 % (ref 36.3–47.1)
HEMOGLOBIN: 9.9 G/DL (ref 11.9–15.1)
IMMATURE GRANULOCYTES: 5 %
LYMPHOCYTES # BLD: 12 % (ref 24–43)
MAGNESIUM: 1.7 MG/DL (ref 1.6–2.6)
MCH RBC QN AUTO: 35.1 PG (ref 25.2–33.5)
MCHC RBC AUTO-ENTMCNC: 32.2 G/DL (ref 28.4–34.8)
MCV RBC AUTO: 108.9 FL (ref 82.6–102.9)
MODE: NORMAL
MONOCYTES # BLD: 11 % (ref 3–12)
NEGATIVE BASE EXCESS, ART: NORMAL (ref 0–2)
NRBC AUTOMATED: 0 PER 100 WBC
O2 DEVICE/FLOW/%: NORMAL
PATIENT TEMP: NORMAL
PDW BLD-RTO: ABNORMAL % (ref 11.8–14.4)
PHOSPHORUS: 2.4 MG/DL (ref 2.6–4.5)
PLATELET # BLD: 370 K/UL (ref 138–453)
PLATELET ESTIMATE: ABNORMAL
PMV BLD AUTO: 9.3 FL (ref 8.1–13.5)
POC HCO3: 25.9 MMOL/L (ref 21–28)
POC O2 SATURATION: 98 % (ref 94–98)
POC PCO2 TEMP: NORMAL MM HG
POC PCO2: 39.5 MM HG (ref 35–48)
POC PH TEMP: NORMAL
POC PH: 7.42 (ref 7.35–7.45)
POC PO2 TEMP: NORMAL MM HG
POC PO2: 98.9 MM HG (ref 83–108)
POSITIVE BASE EXCESS, ART: 1 (ref 0–3)
POTASSIUM SERPL-SCNC: 4 MMOL/L (ref 3.7–5.3)
RBC # BLD: 2.82 M/UL (ref 3.95–5.11)
RBC # BLD: ABNORMAL 10*6/UL
SAMPLE SITE: NORMAL
SEG NEUTROPHILS: 72 % (ref 36–65)
SEGMENTED NEUTROPHILS ABSOLUTE COUNT: 5.87 K/UL (ref 1.5–8.1)
SODIUM BLD-SCNC: 143 MMOL/L (ref 135–144)
TCO2 (CALC), ART: 27 MMOL/L (ref 22–29)
TRANSFUSION STATUS: NORMAL
TRANSFUSION STATUS: NORMAL
UNIT DIVISION: 0
UNIT DIVISION: 0
UNIT NUMBER: NORMAL
UNIT NUMBER: NORMAL
WBC # BLD: 8.2 K/UL (ref 3.5–11.3)
WBC # BLD: ABNORMAL 10*3/UL

## 2018-01-24 PROCEDURE — 6360000002 HC RX W HCPCS

## 2018-01-24 PROCEDURE — 99291 CRITICAL CARE FIRST HOUR: CPT | Performed by: PSYCHIATRY & NEUROLOGY

## 2018-01-24 PROCEDURE — 92610 EVALUATE SWALLOWING FUNCTION: CPT

## 2018-01-24 PROCEDURE — 36415 COLL VENOUS BLD VENIPUNCTURE: CPT

## 2018-01-24 PROCEDURE — 6360000002 HC RX W HCPCS: Performed by: NURSE PRACTITIONER

## 2018-01-24 PROCEDURE — G8996 SWALLOW CURRENT STATUS: HCPCS

## 2018-01-24 PROCEDURE — 6370000000 HC RX 637 (ALT 250 FOR IP): Performed by: PSYCHIATRY & NEUROLOGY

## 2018-01-24 PROCEDURE — 71045 X-RAY EXAM CHEST 1 VIEW: CPT

## 2018-01-24 PROCEDURE — 85025 COMPLETE CBC W/AUTO DIFF WBC: CPT

## 2018-01-24 PROCEDURE — 82803 BLOOD GASES ANY COMBINATION: CPT

## 2018-01-24 PROCEDURE — G8978 MOBILITY CURRENT STATUS: HCPCS

## 2018-01-24 PROCEDURE — 84100 ASSAY OF PHOSPHORUS: CPT

## 2018-01-24 PROCEDURE — 94762 N-INVAS EAR/PLS OXIMTRY CONT: CPT

## 2018-01-24 PROCEDURE — 6360000002 HC RX W HCPCS: Performed by: EMERGENCY MEDICINE

## 2018-01-24 PROCEDURE — 97110 THERAPEUTIC EXERCISES: CPT

## 2018-01-24 PROCEDURE — 6370000000 HC RX 637 (ALT 250 FOR IP): Performed by: NURSE PRACTITIONER

## 2018-01-24 PROCEDURE — 83735 ASSAY OF MAGNESIUM: CPT

## 2018-01-24 PROCEDURE — 2500000003 HC RX 250 WO HCPCS: Performed by: EMERGENCY MEDICINE

## 2018-01-24 PROCEDURE — 2500000003 HC RX 250 WO HCPCS: Performed by: NURSE PRACTITIONER

## 2018-01-24 PROCEDURE — 6360000002 HC RX W HCPCS: Performed by: PSYCHIATRY & NEUROLOGY

## 2018-01-24 PROCEDURE — 82947 ASSAY GLUCOSE BLOOD QUANT: CPT

## 2018-01-24 PROCEDURE — 2000000003 HC NEURO ICU R&B

## 2018-01-24 PROCEDURE — 2580000003 HC RX 258: Performed by: EMERGENCY MEDICINE

## 2018-01-24 PROCEDURE — G8979 MOBILITY GOAL STATUS: HCPCS

## 2018-01-24 PROCEDURE — 94770 HC ETCO2 MONITOR DAILY: CPT

## 2018-01-24 PROCEDURE — 6370000000 HC RX 637 (ALT 250 FOR IP): Performed by: EMERGENCY MEDICINE

## 2018-01-24 PROCEDURE — 94003 VENT MGMT INPAT SUBQ DAY: CPT

## 2018-01-24 PROCEDURE — 36600 WITHDRAWAL OF ARTERIAL BLOOD: CPT

## 2018-01-24 PROCEDURE — 97530 THERAPEUTIC ACTIVITIES: CPT

## 2018-01-24 PROCEDURE — 97162 PT EVAL MOD COMPLEX 30 MIN: CPT

## 2018-01-24 PROCEDURE — 82330 ASSAY OF CALCIUM: CPT

## 2018-01-24 PROCEDURE — 76937 US GUIDE VASCULAR ACCESS: CPT

## 2018-01-24 PROCEDURE — 80048 BASIC METABOLIC PNL TOTAL CA: CPT

## 2018-01-24 PROCEDURE — G8997 SWALLOW GOAL STATUS: HCPCS

## 2018-01-24 PROCEDURE — 99233 SBSQ HOSP IP/OBS HIGH 50: CPT | Performed by: PSYCHIATRY & NEUROLOGY

## 2018-01-24 PROCEDURE — 76770 US EXAM ABDO BACK WALL COMP: CPT

## 2018-01-24 PROCEDURE — 2580000003 HC RX 258: Performed by: NURSE PRACTITIONER

## 2018-01-24 RX ORDER — FENTANYL CITRATE 50 UG/ML
INJECTION, SOLUTION INTRAMUSCULAR; INTRAVENOUS
Status: DISPENSED
Start: 2018-01-24 | End: 2018-01-25

## 2018-01-24 RX ORDER — SODIUM CHLORIDE 9 MG/ML
INJECTION, SOLUTION INTRAVENOUS CONTINUOUS
Status: DISCONTINUED | OUTPATIENT
Start: 2018-01-24 | End: 2018-01-26

## 2018-01-24 RX ORDER — FERROUS SULFATE 300 MG/5ML
300 LIQUID (ML) ORAL 3 TIMES DAILY
Status: DISCONTINUED | OUTPATIENT
Start: 2018-01-24 | End: 2018-01-26

## 2018-01-24 RX ORDER — LABETALOL HYDROCHLORIDE 5 MG/ML
10 INJECTION, SOLUTION INTRAVENOUS
Status: DISCONTINUED | OUTPATIENT
Start: 2018-01-24 | End: 2018-01-26

## 2018-01-24 RX ORDER — ASPIRIN 81 MG/1
81 TABLET, CHEWABLE ORAL DAILY
Status: DISCONTINUED | OUTPATIENT
Start: 2018-01-24 | End: 2018-01-26

## 2018-01-24 RX ORDER — ALBUTEROL SULFATE 2.5 MG/3ML
2.5 SOLUTION RESPIRATORY (INHALATION) EVERY 4 HOURS PRN
Status: DISCONTINUED | OUTPATIENT
Start: 2018-01-24 | End: 2018-01-26 | Stop reason: HOSPADM

## 2018-01-24 RX ORDER — VALSARTAN 160 MG/1
160 TABLET ORAL DAILY
Status: DISCONTINUED | OUTPATIENT
Start: 2018-01-24 | End: 2018-01-26

## 2018-01-24 RX ORDER — HYDRALAZINE HYDROCHLORIDE 20 MG/ML
10 INJECTION INTRAMUSCULAR; INTRAVENOUS EVERY 6 HOURS PRN
Status: DISCONTINUED | OUTPATIENT
Start: 2018-01-24 | End: 2018-01-26

## 2018-01-24 RX ORDER — PANTOPRAZOLE SODIUM 40 MG/1
40 TABLET, DELAYED RELEASE ORAL
Status: DISCONTINUED | OUTPATIENT
Start: 2018-01-24 | End: 2018-01-24

## 2018-01-24 RX ORDER — ASPIRIN 300 MG/1
150 SUPPOSITORY RECTAL ONCE
Status: COMPLETED | OUTPATIENT
Start: 2018-01-24 | End: 2018-01-24

## 2018-01-24 RX ORDER — HYDROXYUREA 500 MG/1
500 CAPSULE ORAL 3 TIMES DAILY
Status: DISCONTINUED | OUTPATIENT
Start: 2018-01-24 | End: 2018-01-26

## 2018-01-24 RX ORDER — FENTANYL CITRATE 50 UG/ML
25 INJECTION, SOLUTION INTRAMUSCULAR; INTRAVENOUS
Status: DISCONTINUED | OUTPATIENT
Start: 2018-01-24 | End: 2018-01-26 | Stop reason: HOSPADM

## 2018-01-24 RX ORDER — ESOMEPRAZOLE SODIUM 40 MG/5ML
40 INJECTION INTRAVENOUS 2 TIMES DAILY
Status: DISCONTINUED | OUTPATIENT
Start: 2018-01-24 | End: 2018-01-25

## 2018-01-24 RX ORDER — QUETIAPINE FUMARATE 100 MG/1
100 TABLET, FILM COATED ORAL NIGHTLY
Status: DISCONTINUED | OUTPATIENT
Start: 2018-01-24 | End: 2018-01-26

## 2018-01-24 RX ORDER — 0.9 % SODIUM CHLORIDE 0.9 %
10 VIAL (ML) INJECTION 2 TIMES DAILY
Status: DISCONTINUED | OUTPATIENT
Start: 2018-01-24 | End: 2018-01-25

## 2018-01-24 RX ORDER — OXCARBAZEPINE 300 MG/5ML
300 SUSPENSION ORAL 2 TIMES DAILY
Status: DISCONTINUED | OUTPATIENT
Start: 2018-01-24 | End: 2018-01-26

## 2018-01-24 RX ORDER — VENLAFAXINE 75 MG/1
75 TABLET ORAL
Status: DISCONTINUED | OUTPATIENT
Start: 2018-01-24 | End: 2018-01-26

## 2018-01-24 RX ORDER — FENTANYL CITRATE 50 UG/ML
25 INJECTION, SOLUTION INTRAMUSCULAR; INTRAVENOUS ONCE
Status: COMPLETED | OUTPATIENT
Start: 2018-01-24 | End: 2018-01-24

## 2018-01-24 RX ORDER — HEPARIN SODIUM 5000 [USP'U]/ML
5000 INJECTION, SOLUTION INTRAVENOUS; SUBCUTANEOUS EVERY 8 HOURS SCHEDULED
Status: DISCONTINUED | OUTPATIENT
Start: 2018-01-24 | End: 2018-01-26

## 2018-01-24 RX ADMIN — NICARDIPINE HYDROCHLORIDE 10 MG/HR: 0.1 INJECTION, SOLUTION INTRAVENOUS at 21:40

## 2018-01-24 RX ADMIN — NICARDIPINE HYDROCHLORIDE 10 MG/HR: 0.1 INJECTION, SOLUTION INTRAVENOUS at 23:31

## 2018-01-24 RX ADMIN — Medication 10 ML: at 18:34

## 2018-01-24 RX ADMIN — NICARDIPINE HYDROCHLORIDE 10 MG/HR: 0.1 INJECTION, SOLUTION INTRAVENOUS at 09:18

## 2018-01-24 RX ADMIN — NICARDIPINE HYDROCHLORIDE 10 MG/HR: 0.1 INJECTION, SOLUTION INTRAVENOUS at 19:35

## 2018-01-24 RX ADMIN — NICARDIPINE HYDROCHLORIDE 5 MG/HR: 0.1 INJECTION, SOLUTION INTRAVENOUS at 06:32

## 2018-01-24 RX ADMIN — METOPROLOL TARTRATE 12.5 MG: 25 TABLET ORAL at 09:03

## 2018-01-24 RX ADMIN — OXCARBAZEPINE 300 MG: 300 TABLET, FILM COATED ORAL at 09:03

## 2018-01-24 RX ADMIN — LABETALOL HYDROCHLORIDE 10 MG: 5 INJECTION, SOLUTION INTRAVENOUS at 14:52

## 2018-01-24 RX ADMIN — ESOMEPRAZOLE SODIUM 40 MG: 40 INJECTION INTRAVENOUS at 18:32

## 2018-01-24 RX ADMIN — POTASSIUM PHOSPHATE, MONOBASIC AND POTASSIUM PHOSPHATE, DIBASIC 20 MMOL: 224; 236 INJECTION, SOLUTION, CONCENTRATE INTRAVENOUS at 13:38

## 2018-01-24 RX ADMIN — HYDRALAZINE HYDROCHLORIDE 10 MG: 20 INJECTION INTRAMUSCULAR; INTRAVENOUS at 12:36

## 2018-01-24 RX ADMIN — FENTANYL CITRATE 25 MCG: 50 INJECTION, SOLUTION INTRAMUSCULAR; INTRAVENOUS at 13:37

## 2018-01-24 RX ADMIN — NICARDIPINE HYDROCHLORIDE 15 MG/HR: 0.1 INJECTION, SOLUTION INTRAVENOUS at 17:53

## 2018-01-24 RX ADMIN — Medication 10 ML: at 09:05

## 2018-01-24 RX ADMIN — NICARDIPINE HYDROCHLORIDE 12.5 MG/HR: 0.1 INJECTION, SOLUTION INTRAVENOUS at 14:50

## 2018-01-24 RX ADMIN — NICARDIPINE HYDROCHLORIDE 15 MG/HR: 0.1 INJECTION, SOLUTION INTRAVENOUS at 16:03

## 2018-01-24 RX ADMIN — FENTANYL CITRATE 25 MCG: 50 INJECTION, SOLUTION INTRAMUSCULAR; INTRAVENOUS at 18:00

## 2018-01-24 RX ADMIN — HEPARIN SODIUM 5000 UNITS: 5000 INJECTION, SOLUTION INTRAVENOUS; SUBCUTANEOUS at 14:37

## 2018-01-24 RX ADMIN — MAGNESIUM SULFATE 2 G: 500 INJECTION, SOLUTION INTRAMUSCULAR; INTRAVENOUS at 13:38

## 2018-01-24 RX ADMIN — NICARDIPINE HYDROCHLORIDE 12.5 MG/HR: 0.1 INJECTION, SOLUTION INTRAVENOUS at 13:08

## 2018-01-24 RX ADMIN — ASPIRIN 150 MG: 300 SUPPOSITORY RECTAL at 18:32

## 2018-01-24 RX ADMIN — HEPARIN SODIUM 5000 UNITS: 5000 INJECTION, SOLUTION INTRAVENOUS; SUBCUTANEOUS at 21:40

## 2018-01-24 RX ADMIN — Medication 8 MG/HR: at 08:04

## 2018-01-24 RX ADMIN — Medication 2.5 MG: at 09:03

## 2018-01-24 RX ADMIN — HYDRALAZINE HYDROCHLORIDE 10 MG: 20 INJECTION INTRAMUSCULAR; INTRAVENOUS at 04:12

## 2018-01-24 ASSESSMENT — PULMONARY FUNCTION TESTS
PIF_VALUE: 11
PIF_VALUE: 20
PIF_VALUE: 21
PIF_VALUE: 12
PIF_VALUE: 11
PIF_VALUE: 11
PIF_VALUE: 12
PIF_VALUE: 12
PIF_VALUE: 14
PIF_VALUE: 12
PIF_VALUE: 21
PIF_VALUE: 12

## 2018-01-24 ASSESSMENT — PAIN SCALES - GENERAL: PAINLEVEL_OUTOF10: 4

## 2018-01-24 NOTE — PROGRESS NOTES
minimal, 1-2 mm, of left-to-right midline shift. The remainder of brain parenchymal density pattern is unremarkable. Posterior fossa is unremarkable. There are is mild partial effacement of left lateral ventricle. Ventricular configuration is otherwise stable. There are no extra-axial collections. ORBITS: The visualized portion of the orbits demonstrate no acute abnormality. SINUSES: The visualized paranasal sinuses and mastoid air cells demonstrate no acute abnormality. SOFT TISSUES/SKULL:  No acute abnormality of the visualized skull or soft tissues. Findings compatible with evolving left MCA territorial infarct. High density material within left basal ganglia is partially resorbed, most consistent with contrast staining although hemorrhagic component is not excluded. Brain MRI will be helpful in this regard. Ct Head Wo Contrast    Result Date: 1/22/2018  EXAMINATION: CT OF THE HEAD WITHOUT CONTRAST  1/22/2018 5:00 pm TECHNIQUE: CT of the head was performed without the administration of intravenous contrast. Dose modulation, iterative reconstruction, and/or weight based adjustment of the mA/kV was utilized to reduce the radiation dose to as low as reasonably achievable. COMPARISON: CT head 01/22/2018 at 1027 hours, CTA at 1046 hours HISTORY: ORDERING SYSTEM PROVIDED HISTORY: post angio/tPA concern for hemorrhagic conversion TECHNOLOGIST PROVIDED HISTORY: Has a \"code stroke\" or \"stroke alert\" been called? ->No FINDINGS: BRAIN/VENTRICLES: Left left MCA territory hypoattenuation with blurring of gray-white differentiation and mass-effect on the left lateral ventricle and adjacent sulci. Patchy areas of high attenuation are present as well. No midline shift or new infarct. ORBITS: The visualized portion of the orbits demonstrate no acute abnormality. SINUSES: The visualized paranasal sinuses and mastoid air cells demonstrate no acute abnormality.  SOFT TISSUES/SKULL:  No acute abnormality of the visualized skull or soft tissues. Features of left MCA territory infarct. Although patchy areas of high attenuation could represent contrast staining, close interval follow-up is recommended as superimposed hemorrhage is not excluded. No new infarct. Ct Head Wo Contrast    Result Date: 1/22/2018  EXAMINATION: CT OF THE HEAD WITHOUT CONTRAST,  1/22/2018 10:28 am TECHNIQUE: CT of the head was performed without the administration of intravenous contrast. Dose modulation, iterative reconstruction, and/or weight based adjustment of the mA/kV was utilized to reduce the radiation dose to as low as reasonably achievable. COMPARISON: None HISTORY: ORDERING SYSTEM PROVIDED HISTORY: possible stroke Leftward gaze preference. Concern for left MCA stroke. FINDINGS: BRAIN/VENTRICLES: There is no acute intracranial hemorrhage, mass effect or midline shift. No abnormal extra-axial fluid collection. There is hypoattenuation of the left basal ganglia and lateral aspect of the left thalamus There is no evidence of hydrocephalus. Basal cisterns are patent. ORBITS: The visualized portion of the orbits demonstrate no acute abnormality. SINUSES: The visualized paranasal sinuses and mastoid air cells demonstrate no acute abnormality. SOFT TISSUES/SKULL:  No acute abnormality of the visualized skull or soft tissues. Patchy hypoattenuation of the left basal ganglia and lateral aspect of the left thalamus, concerning for acute left MCA infarct. Findings were discussed with Stuart Hitchcock at 10:38 a.m. on 1/22/2018.      Xr Chest Portable    Result Date: 1/23/2018  EXAMINATION: SINGLE VIEW OF THE CHEST 1/23/2018 6:46 am COMPARISON: 01/22/2018, 2136 hours HISTORY: ORDERING SYSTEM PROVIDED HISTORY: ventilator care TECHNOLOGIST PROVIDED HISTORY: Reason for exam:->ventilator care 64-year-old intubated female receiving ventilator support/care FINDINGS: Portable supine view of the chest. Endotracheal tube distal tip overlying the mid visualized portion of the larynx and pharynx appear unremarkable. The parotid and submandibular salivary glands are unremarkable. Heterogeneous thyroid gland. BONES: Bones are osteopenic. No acute osseous abnormality. CTA HEAD: ANTERIOR CIRCULATION: There is absence of contrast related enhancement of the left internal carotid artery and left M1 segment on arterial, venous, and delayed images. There is evidence of good MCA collaterals with visualization of diminutive left M2 and M3 branches on arterial, venous, and delayed phases. Atherosclerotic calcification of the cavernous right internal carotid artery without flow-limiting stenosis. An anterior communicating artery is visualized. Left A1 segment appears hypoplastic. The anterior cerebral and middle cerebral arteries demonstrate no focal stenosis. POSTERIOR CIRCULATION: No flow-limiting stenosis of the intracranial vertebral arteries. No flow-limiting stenosis of the basilar or bilateral posterior cerebral arteries. ANEURYSM: No intracranial aneurysm is seen. BRAIN: No mass effect or midline shift. No abnormal extra-axial fluid collection. Re-demonstration of left basal ganglia and thalamic hypoattenuation, consistent with left MCA infarct. 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 The left internal carotid artery is occluded. Good left MCA collaterals, with visualization of left M2 and M3 branches on arterial, venous, and delayed phases. Mild, less than 50%, stenosis of the right internal carotid artery by NASCET criteria. No additional intracranial flow-limiting stenosis or aneurysm. Hypoattenuation of the left basal ganglia and left thalamus, consistent with acute left MCA infarct. Findings were discussed with Hallie Pearson at 11:12 a.m. and 11:26 a.m. on 1/22/2018.      Cta Head W Contrast    Result Date: 1/23/2018  EXAMINATION: CTA OF THE NECK; CTA OF THE HEAD WITH CONTRAST from the right subclavian artery. The left vertebral artery arises from the aortic arch. The right vertebral artery is mildly dominant. There is mild atherosclerotic narrowing of the left vertebral artery origin. SOFT TISSUES: Visualized lungs demonstrate a small right pleural effusion and associated compressive atelectasis. No cervical or superior mediastinal lymphadenopathy. The visualized portion of the larynx and pharynx appear unremarkable. The parotid and submandibular salivary glands are unremarkable. Heterogeneous thyroid gland. BONES: Bones are osteopenic. No acute osseous abnormality. CTA HEAD: ANTERIOR CIRCULATION: There is absence of contrast related enhancement of the left internal carotid artery and left M1 segment on arterial, venous, and delayed images. There is evidence of good MCA collaterals with visualization of diminutive left M2 and M3 branches on arterial, venous, and delayed phases. Atherosclerotic calcification of the cavernous right internal carotid artery without flow-limiting stenosis. An anterior communicating artery is visualized. Left A1 segment appears hypoplastic. The anterior cerebral and middle cerebral arteries demonstrate no focal stenosis. POSTERIOR CIRCULATION: No flow-limiting stenosis of the intracranial vertebral arteries. No flow-limiting stenosis of the basilar or bilateral posterior cerebral arteries. ANEURYSM: No intracranial aneurysm is seen. BRAIN: No mass effect or midline shift. No abnormal extra-axial fluid collection. Re-demonstration of left basal ganglia and thalamic hypoattenuation, consistent with left MCA infarct. 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 The left internal carotid artery is occluded. Good left MCA collaterals, with visualization of left M2 and M3 branches on arterial, venous, and delayed phases.  Mild, less than 50%, stenosis of the right internal Evolving acute/early subacute infarction within the left MCA territory effecting the left basal ganglia, left frontal, temporal, and parietal lobes. There is minimal petechial hemorrhage in the left basal ganglia. 2. Smaller acute/early subacute infarction in the left cerebellum. 3. Normal flow voids within the visualized left internal carotid artery and left MCA consistent with patency. 4. Parenchymal volume loss and sequela of chronic microvascular ischemic changes. The findings were sent to the Radiology Results [address] at 11:31 am on 1/23/2018 to be communicated to a licensed caregiver. Labs and Images reviewed with:  [x] Anibal Szymanski  [] Nazia Tristan MD      [] [facility]  [] Kyaw Mello  [x] there are no new interval images to review. PHYSICAL EXAM       CONSTITUTIONAL:  Well developed, well nourished. Intubated, sedation held. Opens eyes spontaneously. Follows commands on the left. HEAD:  normocephalic, atraumatic    EYES:  R pupil 3mm brisk, L pupil 4mm brisk, Left gaze preference, does not cross midline   ENT:  moist mucous membranes   NECK:  supple, symmetric   LUNGS:  Equal air entry bilaterally, clear   CARDIOVASCULAR:  normal s1 / s2, RRR, distal pulses intact, no hematoma over right groin, right AC and left wrist edema stable r/t infiltrated IVs   ABDOMEN:  Soft, no rigidity, normal bowel sounds, No coffee ground residuals OG   NEUROLOGIC:  Mental Status:  Intubated, Precedex held. Opens eyes spontaneously. Follows commands on left side. Attempts to stick out tongue. Cranial Nerves:    II: Visual fields:  abnormal - right hemianopsia  III: Pupils:  R Pupil 3mm brisk, L pupil 4mm brisk  III,IV,VI: Extra Ocular Movements: abnormal, left gaze preference, does not cross midline  V: Corneal reflex:  completed.   intact  VII: Facial strength: abnormal, intubated, difficult to assess, right facial droop    Motor Exam:    Moves left upper extremity to

## 2018-01-24 NOTE — PROGRESS NOTES
Endovascular Neurosurgery Progress Note    Pt Name: Susy Coleman  MRN: [id number]  YOB: 1940  Date of evaluation: 1/24/2018    SUBJECTIVE:   24 hours events:    Currently intubated and following simple commands with left hemibody    Medications  Prior to Admission medications    Medication Sig Start Date End Date Taking?  Authorizing Provider   magnesium hydroxide (MILK OF MAGNESIA) 400 MG/5ML suspension Take 30 mLs by mouth daily as needed for Constipation 12/21/17   Orly Agosto MD   OXcarbazepine (TRILEPTAL) 300 MG tablet Take 1 tablet by mouth 2 times daily 12/21/17   Orly Agosto MD   venlafaxine 225 MG extended release tablet take 1 tablet by mouth every morning 12/21/17   Orly Agosto MD   pioglitazone (ACTOS) 15 MG tablet take 1 tablet by mouth once daily 12/21/17   Orly Agosto MD   atorvastatin (LIPITOR) 20 MG tablet Take 1 tablet by mouth daily 12/21/17   Orly Agosto MD   valsartan (DIOVAN) 160 MG tablet Take 1 tablet by mouth daily 12/21/17   Orly Agosto MD   hydroxyurea (HYDREA) 500 MG chemo capsule 1 tab daily 12/21/17   Orly Agosto MD   hydroxyurea (HYDREA) 500 MG chemo capsule Take 1 capsule by mouth 3 times daily 12/21/17   Orly Agosto MD   QUEtiapine (SEROQUEL) 300 MG tablet take 1 tablet by mouth at bedtime 12/21/17   Orly Agosto MD   metoprolol succinate (TOPROL XL) 25 MG extended release tablet Take 1 tablet by mouth daily 12/21/17   Orly Agosto MD   lidocaine (LIDODERM) 5 % Place 1 patch onto the skin daily 12 hours on, 12 hours off. 12/22/17   Orly Agosto MD   hydrochlorothiazide (HYDRODIURIL) 25 MG tablet Take 1 tablet by mouth daily 12/21/17   Orly Agosto MD   ferrous sulfate 325 (65 Fe) MG EC tablet Take 1 tablet by mouth 3 times daily (with meals) 12/21/17   Orly Agosto MD   omeprazole (PRILOSEC) 20 MG delayed release capsule Take 1 capsule by mouth Daily 12/21/17 preference on 1/22/18. CT head showed patchy hypo densities in left BG and thalamus. CTA head and neck showed proximal left CCA occlusion for which she underwent vacuum pump reperfusion and mechanical thrombectomy of left CCA/ICA/ECA and MCA on 1/22/18 with TICI 2C recanalization. PLANS:   MRI brain: Evolving acute/early subacute infarction within the left MCA territory effecting the left basal ganglia, left frontal, temporal, and parietal lobes. There is minimal petechial hemorrhage in the left basal ganglia. Hb stable  Aspirin will be started today. Anticoagulation for A fib to be determined later on.    Weaning trial and extubation when deemed appropriate by NICU team.    Plan discussed with Dr Stew Wolfe MD  Pager [phone]  Stroke, [facility]  [id number] [place]  Electronically signed 1/24/2018 at 8:50 AM

## 2018-01-24 NOTE — PROGRESS NOTES
Physical Therapy  DATE: 2018    NAME: Vincenzo Martinez  MRN: [id number]   : 1940    Patient not seen this date for Physical Therapy due to:  [] Blood transfusion in progress  [] Hemodialysis  []  Patient Declined  [] Spine Precautions   [] Strict Bedrest  [] Surgery/ Procedure  [] Testing      [x] Other--pt weaning, hoping to extubate today; ck pm and evaluate as appropriate        [] PT being discontinued at this time. Patient independent. No further needs. [] PT being discontinued at this time as the patient has been transferred to palliative care. No further needs.     Ulisses Montano, PT

## 2018-01-24 NOTE — PLAN OF CARE
Problem: MECHANICAL VENTILATION  Goal: Patient will maintain patent airway  Outcome: Ongoing    Goal: Oral health is maintained or improved  Outcome: Ongoing    Goal: ET tube will be managed safely  Outcome: Ongoing    Goal: Ability to express needs and understand communication  Outcome: Ongoing      Problem: COMMUNICATION IMPAIRMENT  Goal: Ability to express needs and understand communication  Outcome: Ongoing      Problem: OXYGENATION/RESPIRATORY FUNCTION  Goal: Patient will achieve/maintain normal respiratory rate/effort  Respiratory rate and effort will be within normal limits for the patient   Outcome: Ongoing    Goal: Patient will maintain patent airway  Outcome: Ongoing      Problem: SKIN INTEGRITY  Goal: Skin integrity is maintained or improved  Outcome: Ongoing

## 2018-01-24 NOTE — PROGRESS NOTES
[facility] [I63.9] . Vitals:    01/24/18 0903   BP: (!) 152/48   Pulse: 84   Resp:    Temp:    SpO2:    . Patients home meds are   Prior to Admission medications    Medication Sig Start Date End Date Taking?  Authorizing Provider   magnesium hydroxide (MILK OF MAGNESIA) 400 MG/5ML suspension Take 30 mLs by mouth daily as needed for Constipation 12/21/17   Danae Zheng MD   OXcarbazepine (TRILEPTAL) 300 MG tablet Take 1 tablet by mouth 2 times daily 12/21/17   Danae Zheng MD   venlafaxine 225 MG extended release tablet take 1 tablet by mouth every morning 12/21/17   Danae Zheng MD   pioglitazone (ACTOS) 15 MG tablet take 1 tablet by mouth once daily 12/21/17   Danae Zheng MD   atorvastatin (LIPITOR) 20 MG tablet Take 1 tablet by mouth daily 12/21/17   Danae Zheng MD   valsartan (DIOVAN) 160 MG tablet Take 1 tablet by mouth daily 12/21/17   Danae Zheng MD   hydroxyurea (HYDREA) 500 MG chemo capsule 1 tab daily 12/21/17   Danae Zheng MD   hydroxyurea (HYDREA) 500 MG chemo capsule Take 1 capsule by mouth 3 times daily 12/21/17   Danae Zheng MD   QUEtiapine (SEROQUEL) 300 MG tablet take 1 tablet by mouth at bedtime 12/21/17   Danae Zehng MD   metoprolol succinate (TOPROL XL) 25 MG extended release tablet Take 1 tablet by mouth daily 12/21/17   Danae Zheng MD   lidocaine (LIDODERM) 5 % Place 1 patch onto the skin daily 12 hours on, 12 hours off. 12/22/17   Danae Zheng MD   hydrochlorothiazide (HYDRODIURIL) 25 MG tablet Take 1 tablet by mouth daily 12/21/17   Danae Zheng MD   ferrous sulfate 325 (65 Fe) MG EC tablet Take 1 tablet by mouth 3 times daily (with meals) 12/21/17   Danae Zheng MD   omeprazole (PRILOSEC) 20 MG delayed release capsule Take 1 capsule by mouth Daily 12/21/17   Danae Zheng MD   oxybutynin (DITROPAN-XL) 5 MG extended release tablet Take 1 tablet by mouth daily 12/21/17   Eve Porras MD   acetaminophen (TYLENOL) 500 MG tablet Take 1,000 mg by mouth every 6 hours as needed. Historical Provider, MD   cyanocobalamin 1000 MCG/ML injection Inject 1,000 mcg into the muscle every 30 days.     Historical Provider, MD   .  Recent Surgical History: None = 0     Assessment     Peak Flow (asthma only)    Predicted: 0  Personal Best: 0  PEF 0  % Predicted 0  Peak Flow : not applicable = 0    FXD5/OWR    FEV1 Predicted 0      FEV1 0    FEV1 % Predicted 0  FVC 0  IS volume 0  IBW 0    RR 18  Breath Sounds: clear but diminished      · Bronchodilator assessment at level  1  · Hyperinflation assessment at level   · Secretion Management assessment at level    ·   · [x]    Bronchodilator Assessment  BRONCHODILATOR ASSESSMENT SCORE  Score 0 1 2 3 4 5   Breath Sounds   []  Patient Baseline [x]  No Wheeze good aeration []  Faint, scattered wheezing, good aeration []  Expiratory Wheezing and or moderately diminished []  Insp/Exp wheeze and/or very diminished []  Insp/Exp and/ or marked distress   Respiratory Rate   []  Patient Baseline [x]  Less than 20 []  Less than 20 []  20-25 []  Greater than 25 []  Greater than 25   Peak flow % of Pred or PB [x]  NA   []  Greater than 90%  []  81-90% []  71-80% []  Less than or equal to 70%  or unable to perform []  Unable due to Respiratory Distress   Dyspnea re []  Patient Baseline []  No SOB []  No SOB [x]  SOB on exertion []  SOB min activity []  At rest/acute   e FEV% Predicted       [x]  NA []  Above 69%  []  Unable []  Above 60-69%  []  Unable []  Above 50-59%  []  Unable []  Above 35-49%  []  Unable []  Less than 35%  []  Unable                 []  Hyperinflation Assessment  Score 1 2 3   CXR and Breath Sounds   []  Clear []  No atelectasis  Basilar aeration []  Atelectasis or absent basilar breath sounds   Incentive Spirometry Volume  (Per IBW)   []  Greater than or

## 2018-01-24 NOTE — PROGRESS NOTES
Physical Therapy    Facility/Department: [address] NSICU  Initial Assessment    NAME: Kate Ortega  : 1940  MRN: [id number]    Date of Service: 2018  Information taken from pt's medical record: Patient is a 75 yo female with a history of Afib, HTN, HLD, DM 2 who presented to the ED on 18 via EMS from [facility]. Per records patient woke up normal this morning and then around 9:15am started to have mental status changes. Per NAN in the ED patient was unresponsive with right upper extremity weakness with no movement to pain and right lower extremity weakness, flex/withdraw from pain. Left pupil was 5mm, non-reactive and right pupil was 5mm minimally reactive. NIH was 26. CTH showed acute left MCA infarct. CTAH/N showed occlusion of the left common carotid artery. tPA bolus administered at 1053 AM.  Patient was taken emergently to angio where she underwent mechanical thrombectomy of the left CCA, ICA, and ECA as well as the left MCA. Patient began to vomit while on the table after groin puncture and was intubated by anesthesia.       Post procedure patient arrive to NSICU on propofol at 75mcg/kg/hr. Her was hypertensive with SBP in the 180's. Cardene gtt was started and titrated up to max dose but SBP remained in the 160's. Blood pressure improved with SBP in the  range with Labetalol 20mg IVP x1. Propofol titrated down to 20mcg/kg/hr and then held for exam.  Patient's right pupil was 4mm and briskly reactive, left pupil was 5mm and less reactive. Patient opening her eyes spontaneously intermittently with left gaze preference. Moving left upper and left lower extremity spontaneously, localizes with LUE. Flex/withdraw to pain in the right lower extremity. No movement to pain in right upper extremity. Seen an examined by Dr. Kate Ortega, endovascular. STAT CTH ordered to r/o hemorrhagic conversion due to pupillary response and lack of improvement in right side.   Pt extubated at 1

## 2018-01-25 ENCOUNTER — APPOINTMENT (OUTPATIENT)
Dept: GENERAL RADIOLOGY | Age: 78
DRG: 023 | End: 2018-01-25
Payer: MEDICARE

## 2018-01-25 LAB
ABSOLUTE EOS #: 0 K/UL (ref 0–0.4)
ABSOLUTE IMMATURE GRANULOCYTE: 0.17 K/UL (ref 0–0.3)
ABSOLUTE LYMPH #: 0.59 K/UL (ref 1–4.8)
ABSOLUTE MONO #: 1.01 K/UL (ref 0.1–0.8)
ANION GAP SERPL CALCULATED.3IONS-SCNC: 11 MMOL/L (ref 9–17)
BASOPHILS # BLD: 0 % (ref 0–2)
BASOPHILS ABSOLUTE: 0 K/UL (ref 0–0.2)
BUN BLDV-MCNC: 18 MG/DL (ref 8–23)
BUN/CREAT BLD: ABNORMAL (ref 9–20)
CALCIUM IONIZED: 1.16 MMOL/L (ref 1.13–1.33)
CALCIUM SERPL-MCNC: 8.1 MG/DL (ref 8.6–10.4)
CHLORIDE BLD-SCNC: 109 MMOL/L (ref 98–107)
CO2: 22 MMOL/L (ref 20–31)
CREAT SERPL-MCNC: 0.47 MG/DL (ref 0.5–0.9)
DIFFERENTIAL TYPE: ABNORMAL
EOSINOPHILS RELATIVE PERCENT: 0 % (ref 1–4)
GFR AFRICAN AMERICAN: >60 ML/MIN
GFR NON-AFRICAN AMERICAN: >60 ML/MIN
GFR SERPL CREATININE-BSD FRML MDRD: ABNORMAL ML/MIN/{1.73_M2}
GFR SERPL CREATININE-BSD FRML MDRD: ABNORMAL ML/MIN/{1.73_M2}
GLUCOSE BLD-MCNC: 107 MG/DL (ref 65–105)
GLUCOSE BLD-MCNC: 118 MG/DL (ref 65–105)
GLUCOSE BLD-MCNC: 121 MG/DL (ref 65–105)
GLUCOSE BLD-MCNC: 126 MG/DL (ref 65–105)
GLUCOSE BLD-MCNC: 126 MG/DL (ref 70–99)
HCT VFR BLD CALC: 29.2 % (ref 36.3–47.1)
HEMOGLOBIN: 9 G/DL (ref 11.9–15.1)
IMMATURE GRANULOCYTES: 2 %
LYMPHOCYTES # BLD: 7 % (ref 24–44)
MAGNESIUM: 2 MG/DL (ref 1.6–2.6)
MCH RBC QN AUTO: 35.2 PG (ref 25.2–33.5)
MCHC RBC AUTO-ENTMCNC: 30.8 G/DL (ref 28.4–34.8)
MCV RBC AUTO: 114.1 FL (ref 82.6–102.9)
MONOCYTES # BLD: 12 % (ref 1–7)
MORPHOLOGY: ABNORMAL
NRBC AUTOMATED: 0 PER 100 WBC
PDW BLD-RTO: ABNORMAL % (ref 11.8–14.4)
PHOSPHORUS: 2.6 MG/DL (ref 2.6–4.5)
PLATELET # BLD: 399 K/UL (ref 138–453)
PLATELET ESTIMATE: ABNORMAL
PMV BLD AUTO: 9.2 FL (ref 8.1–13.5)
POTASSIUM SERPL-SCNC: 3.1 MMOL/L (ref 3.7–5.3)
RBC # BLD: 2.56 M/UL (ref 3.95–5.11)
RBC # BLD: ABNORMAL 10*6/UL
SEG NEUTROPHILS: 79 % (ref 36–66)
SEGMENTED NEUTROPHILS ABSOLUTE COUNT: 6.63 K/UL (ref 1.8–7.7)
SODIUM BLD-SCNC: 142 MMOL/L (ref 135–144)
WBC # BLD: 8.4 K/UL (ref 3.5–11.3)
WBC # BLD: ABNORMAL 10*3/UL

## 2018-01-25 PROCEDURE — 6360000002 HC RX W HCPCS: Performed by: PSYCHIATRY & NEUROLOGY

## 2018-01-25 PROCEDURE — 2500000003 HC RX 250 WO HCPCS: Performed by: NURSE PRACTITIONER

## 2018-01-25 PROCEDURE — G9162 LANG EXPRESS CURRENT STATUS: HCPCS

## 2018-01-25 PROCEDURE — 80048 BASIC METABOLIC PNL TOTAL CA: CPT

## 2018-01-25 PROCEDURE — G9159 LANG COMP CURRENT STATUS: HCPCS

## 2018-01-25 PROCEDURE — G8997 SWALLOW GOAL STATUS: HCPCS

## 2018-01-25 PROCEDURE — 99233 SBSQ HOSP IP/OBS HIGH 50: CPT | Performed by: PSYCHIATRY & NEUROLOGY

## 2018-01-25 PROCEDURE — 2580000003 HC RX 258: Performed by: NURSE PRACTITIONER

## 2018-01-25 PROCEDURE — 99221 1ST HOSP IP/OBS SF/LOW 40: CPT | Performed by: FAMILY MEDICINE

## 2018-01-25 PROCEDURE — G9163 LANG EXPRESS GOAL STATUS: HCPCS

## 2018-01-25 PROCEDURE — 6370000000 HC RX 637 (ALT 250 FOR IP): Performed by: NURSE PRACTITIONER

## 2018-01-25 PROCEDURE — G9160 LANG COMP GOAL STATUS: HCPCS

## 2018-01-25 PROCEDURE — 92526 ORAL FUNCTION THERAPY: CPT

## 2018-01-25 PROCEDURE — 71045 X-RAY EXAM CHEST 1 VIEW: CPT

## 2018-01-25 PROCEDURE — 83735 ASSAY OF MAGNESIUM: CPT

## 2018-01-25 PROCEDURE — 97110 THERAPEUTIC EXERCISES: CPT

## 2018-01-25 PROCEDURE — 74230 X-RAY XM SWLNG FUNCJ C+: CPT

## 2018-01-25 PROCEDURE — 82330 ASSAY OF CALCIUM: CPT

## 2018-01-25 PROCEDURE — 36415 COLL VENOUS BLD VENIPUNCTURE: CPT

## 2018-01-25 PROCEDURE — 94762 N-INVAS EAR/PLS OXIMTRY CONT: CPT

## 2018-01-25 PROCEDURE — 92523 SPEECH SOUND LANG COMPREHEN: CPT

## 2018-01-25 PROCEDURE — 84100 ASSAY OF PHOSPHORUS: CPT

## 2018-01-25 PROCEDURE — 82947 ASSAY GLUCOSE BLOOD QUANT: CPT

## 2018-01-25 PROCEDURE — 6360000002 HC RX W HCPCS: Performed by: NURSE PRACTITIONER

## 2018-01-25 PROCEDURE — 93970 EXTREMITY STUDY: CPT

## 2018-01-25 PROCEDURE — 99291 CRITICAL CARE FIRST HOUR: CPT | Performed by: PSYCHIATRY & NEUROLOGY

## 2018-01-25 PROCEDURE — G8996 SWALLOW CURRENT STATUS: HCPCS

## 2018-01-25 PROCEDURE — 92611 MOTION FLUOROSCOPY/SWALLOW: CPT

## 2018-01-25 PROCEDURE — 85025 COMPLETE CBC W/AUTO DIFF WBC: CPT

## 2018-01-25 PROCEDURE — 2000000003 HC NEURO ICU R&B

## 2018-01-25 RX ORDER — ESOMEPRAZOLE SODIUM 40 MG/5ML
40 INJECTION INTRAVENOUS
Status: DISCONTINUED | OUTPATIENT
Start: 2018-01-26 | End: 2018-01-26

## 2018-01-25 RX ORDER — FUROSEMIDE 10 MG/ML
20 INJECTION INTRAMUSCULAR; INTRAVENOUS 2 TIMES DAILY
Status: DISCONTINUED | OUTPATIENT
Start: 2018-01-26 | End: 2018-01-26

## 2018-01-25 RX ORDER — FUROSEMIDE 10 MG/ML
40 INJECTION INTRAMUSCULAR; INTRAVENOUS ONCE
Status: COMPLETED | OUTPATIENT
Start: 2018-01-25 | End: 2018-01-25

## 2018-01-25 RX ORDER — POTASSIUM CHLORIDE 7.45 MG/ML
40 INJECTION INTRAVENOUS ONCE
Status: DISCONTINUED | OUTPATIENT
Start: 2018-01-25 | End: 2018-01-26

## 2018-01-25 RX ORDER — BISACODYL 10 MG
10 SUPPOSITORY, RECTAL RECTAL DAILY
Status: DISCONTINUED | OUTPATIENT
Start: 2018-01-25 | End: 2018-01-26

## 2018-01-25 RX ORDER — POTASSIUM CHLORIDE 7.45 MG/ML
10 INJECTION INTRAVENOUS PRN
Status: DISCONTINUED | OUTPATIENT
Start: 2018-01-25 | End: 2018-01-25

## 2018-01-25 RX ORDER — FUROSEMIDE 20 MG/1
20 TABLET ORAL 2 TIMES DAILY
Status: DISCONTINUED | OUTPATIENT
Start: 2018-01-26 | End: 2018-01-25

## 2018-01-25 RX ORDER — ASPIRIN 300 MG/1
150 SUPPOSITORY RECTAL EVERY 6 HOURS PRN
Status: DISCONTINUED | OUTPATIENT
Start: 2018-01-25 | End: 2018-01-26

## 2018-01-25 RX ORDER — 0.9 % SODIUM CHLORIDE 0.9 %
10 VIAL (ML) INJECTION
Status: DISCONTINUED | OUTPATIENT
Start: 2018-01-26 | End: 2018-01-26

## 2018-01-25 RX ADMIN — NICARDIPINE HYDROCHLORIDE 10 MG/HR: 0.1 INJECTION, SOLUTION INTRAVENOUS at 05:26

## 2018-01-25 RX ADMIN — HEPARIN SODIUM 5000 UNITS: 5000 INJECTION, SOLUTION INTRAVENOUS; SUBCUTANEOUS at 22:17

## 2018-01-25 RX ADMIN — NICARDIPINE HYDROCHLORIDE 10 MG/HR: 0.1 INJECTION, SOLUTION INTRAVENOUS at 01:27

## 2018-01-25 RX ADMIN — LABETALOL HYDROCHLORIDE 10 MG: 5 INJECTION, SOLUTION INTRAVENOUS at 08:26

## 2018-01-25 RX ADMIN — POTASSIUM CHLORIDE 40 MEQ: 149 INJECTION, SOLUTION, CONCENTRATE INTRAVENOUS at 10:55

## 2018-01-25 RX ADMIN — HEPARIN SODIUM 5000 UNITS: 5000 INJECTION, SOLUTION INTRAVENOUS; SUBCUTANEOUS at 05:29

## 2018-01-25 RX ADMIN — FUROSEMIDE 40 MG: 10 INJECTION, SOLUTION INTRAVENOUS at 17:33

## 2018-01-25 RX ADMIN — Medication 10 ML: at 05:29

## 2018-01-25 RX ADMIN — POTASSIUM CHLORIDE 40 MEQ: 149 INJECTION, SOLUTION, CONCENTRATE INTRAVENOUS at 20:35

## 2018-01-25 RX ADMIN — NICARDIPINE HYDROCHLORIDE 10 MG/HR: 0.1 INJECTION, SOLUTION INTRAVENOUS at 03:41

## 2018-01-25 RX ADMIN — LABETALOL HYDROCHLORIDE 10 MG: 5 INJECTION, SOLUTION INTRAVENOUS at 10:54

## 2018-01-25 RX ADMIN — FENTANYL CITRATE 25 MCG: 50 INJECTION, SOLUTION INTRAMUSCULAR; INTRAVENOUS at 17:41

## 2018-01-25 RX ADMIN — BISACODYL 10 MG: 10 SUPPOSITORY RECTAL at 18:51

## 2018-01-25 RX ADMIN — NICARDIPINE HYDROCHLORIDE 10 MG/HR: 0.1 INJECTION, SOLUTION INTRAVENOUS at 07:20

## 2018-01-25 RX ADMIN — HEPARIN SODIUM 5000 UNITS: 5000 INJECTION, SOLUTION INTRAVENOUS; SUBCUTANEOUS at 16:28

## 2018-01-25 RX ADMIN — ESOMEPRAZOLE SODIUM 40 MG: 40 INJECTION INTRAVENOUS at 05:29

## 2018-01-25 RX ADMIN — NICARDIPINE HYDROCHLORIDE 10 MG/HR: 0.1 INJECTION, SOLUTION INTRAVENOUS at 08:40

## 2018-01-25 NOTE — FLOWSHEET NOTE
Visit:  Visiting with patient's daughter Tee Garcia at bedside. Patient is not awake and per Tee Garcia has minimal responses. Assessment:  Tee Garcia is tearful. \"does not want to put her mother through much more. \"   Tee Garcia is only daughter of patient. Tee Garcia is  and has two children who are supportive. Concerns regarding next steps for feeding tube. \"I know she wouldn't want that\". Tee Garcia said patient has lost use of her right side and \"may not be able to speak\". Candace expressed interest in talking to a palliative coordinator. Patient's Ep[employer] preference is [address] [place] [address] Street was receptive to prayer for both of them. Interaction:  Listening presence while daughter spoke of her mother, her previous activities and that it has only been \"not that long that she was totally independent. \"  Notified patient's nurse and palliative coordinator about daughter's desire to talk to \"palliative\".  provided prayer and words of comfort. Plan: Will remain available for spiritual or emotional support for patient and family as needed. Will follow patient via palliative support list.      01/25/18 1130   Encounter Summary   Services provided to: Family; Patient   Referral/Consult From: [address] [facility] Children;Family members   Place of Cheondoism Anabaptism/none   Continue Visiting (1/25)   Complexity of Encounter Moderate   Length of Encounter 30 minutes   Spiritual/Jainism   Type Spiritual support   Assessment Tearful   Intervention Active listening   Outcome Engaged in conversation   Grief and Life Adjustment   Type Adjustment to illness   Outcome Expressed gratitude

## 2018-01-25 NOTE — PROCEDURES
Phase  Esophageal Screen: WFL        Pain   Patient Currently in Pain: No  Pain Level: 4    G-Code:  SLP G-Codes  Functional Limitations: Swallowing  Swallow Current Status (): 100 percent impaired, limited or restricted  Swallow Goal Status (): At least 80 percent but less than 100 percent impaired, limited or restricted  Spoken Language Comprehension Current Status (): At least 80 percent but less than 100 percent impaired, limited or restricted  Spoken Language Comprehension Goal Status (): At least 60 percent but less than 80 percent impaired, limited or restricted  Spoken Language Expression Current Status (): 100 percent impaired, limited or restricted  Spoken Language Expression Goal Status ():  At least 80 percent but less than 100 percent impaired, limited or restricted      Therapy Time:   Individual Concurrent Group Co-treatment   Time In 1415         Time Out 1430         Minutes [facility], 1/25/2018, 2:52 PM

## 2018-01-25 NOTE — PROGRESS NOTES
[address]  Occupational Therapy Not Seen Note    Patient not available for Occupational Therapy due to:    [] Testing:    [] Hemodialysis    [] Blood Transfusion in Progress    []Refusal by Patient:    [] Surgery/Procedure:    [] Strict Bedrest    [] Sedation    [] Spine Precautions     [] Pt being transferred to palliative care at this time. Spoke with pt/family and OT services to be defered. [] Pt independent with functional mobility and functional tasks. Pt with no OT acute care needs at this time, will defer OT eval.    [x] Other: BP currently 173/67 and per RN BP needs to be below 160's. Will hold OT eval.    Next Scheduled Treatment: Attempt this pm or 1/26 as appropriate. Signature:  Krystal SEO/VIRGINIE

## 2018-01-25 NOTE — PROGRESS NOTES
on return from head Ct.    1/24: Patient was successfully extubated. She was given 20 mg IVP lasix for low urine output, which she initially responded well to with 300 mL/3 hrs, then returned to around 15 mL/hr. No further coffee ground residuals. Overnight, patient continued to have low urine outputs, ranging anywhere from 15-50 mL/hr. She continues to have the maldonado in place for strict I&Os. Currently, on exam she is awake and alert with dysarthric speech and right sided facial droop. She follows commands with left upper and lower extremities. No movement to right upper, flexion withdrawal to right lower extremities. She failed her swallow study yesterday, repeating again today. If fails again, will need to place an NG for nutrition and medication administration.      CURRENT MEDICATIONS:  SCHEDULED MEDICATIONS:   bisacodyl  10 mg Rectal Daily    venlafaxine  75 mg Per G Tube TID WC    aspirin  81 mg Oral Daily    valsartan  160 mg Per G Tube Daily    OXcarbazepine  300 mg Per G Tube BID    QUEtiapine  100 mg Per G Tube Nightly    heparin (porcine)  5,000 Units Subcutaneous 3 times per day    ferrous sulfate  300 mg Per G Tube TID    hydroxyurea  500 mg Oral TID    esomeprazole  40 mg Intravenous BID    And    sodium chloride (PF)  10 mL Intravenous BID    sodium chloride  250 mL Intravenous Once    sodium chloride flush  10 mL Intravenous Q12H    metoprolol tartrate  12.5 mg Per G Tube BID    docusate  100 mg Oral Daily    oxybutynin  2.5 mg Per NG tube BID    sodium chloride flush  10 mL Intravenous 2 times per day    atorvastatin  40 mg Oral Nightly    insulin lispro  0-6 Units Subcutaneous Q6H     CONTINUOUS INFUSIONS:   sodium chloride      dextrose      niCARdipine 15 mg/hr (01/25/18 0721)     PRN MEDICATIONS:   potassium chloride 40 mEq in sodium chloride 0.9% 500 mL IVPB, albuterol, hydrALAZINE, labetalol, fentanNYL, sodium chloride flush, sodium chloride flush, acetaminophen, magnesium hydroxide, ondansetron, glucose, dextrose, glucagon (rDNA), dextrose    VITALS:  Temperature Range: Temp: 98.6 °F (37 °C) Temp  Av.4 °F (36.9 °C)  Min: 97.2 °F (36.2 °C)  Max: 99.9 °F (37.7 °C)  BP Range: Systolic (87DAP), KDI:170 , Min:124 , WCV:783     Diastolic (78IHC), ZGJ:12, Min:37, Max:139    Pulse Range: Pulse  Av.1  Min: 76  Max: 107  Respiration Range: Resp  Av.1  Min: 13  Max: 28  Current Pulse Ox: SpO2: 98 %  24HR Pulse Ox Range: SpO2  Av.3 %  Min: 98 %  Max: 100 %  Patient Vitals for the past 12 hrs:   BP Temp Temp src Pulse Resp SpO2   18 0648 138/75 - - 101 18 98 %   18 0633 124/77 - - 100 22 98 %   18 0618 (!) 163/58 - - 103 19 98 %   18 0603 (!) 158/53 - - 104 18 99 %   18 0548 (!) 147/51 - - 99 18 100 %   18 0533 (!) 138/52 - - 97 24 100 %   18 0518 (!) 132/48 - - 95 28 99 %   18 0503 (!) 134/49 - - 95 24 100 %   18 0448 (!) 142/49 - - 97 18 100 %   18 0433 (!) 138/46 - - 97 26 100 %   18 0418 (!) 148/51 - - 98 22 99 %   18 0403 (!) 145/49 98.6 °F (37 °C) Oral 99 17 99 %   18 0348 (!) 152/45 - - 95 16 100 %   18 0333 - - - 92 28 99 %   18 0318 (!) 150/50 - - 93 15 99 %   18 0303 (!) 140/58 - - 94 16 99 %   18 0248 (!) 152/53 - - 94 15 99 %   18 0133 (!) 151/44 - - 92 17 99 %   18 0118 (!) 148/53 - - 94 17 98 %   18 0103 (!) 144/54 - - 97 17 99 %   18 0048 (!) 154/53 - - 101 17 99 %   18 0033 (!) 149/53 - - 101 16 99 %   18 0018 (!) 144/50 - - 98 17 99 %   18 0003 (!) 145/49 97.2 °F (36.2 °C) Oral 98 16 99 %   18 2348 (!) 140/45 - - 96 17 99 %   18 2333 (!) 142/51 - - 95 16 99 %   18 2318 (!) 139/49 - - 94 15 99 %   18 2303 (!) 139/55 - - 99 17 99 %   18 2248 (!) 146/45 - - 96 16 99 %   18 2233 (!) 152/47 - - 99 16 99 %   18 2218 (!) 161/49 - - 106 17 99 %   18 2148 (!) 150/44 - - left basal ganglia and lateral aspect of the left thalamus, concerning for acute left MCA infarct. Findings were discussed with Stan Segura at 10:38 a.m. on 1/22/2018. Xr Chest Portable    Result Date: 1/25/2018  EXAMINATION: SINGLE VIEW OF THE CHEST 1/25/2018 6:37 am COMPARISON: 01/24/2018 HISTORY: ORDERING SYSTEM PROVIDED HISTORY: ventilator care TECHNOLOGIST PROVIDED HISTORY: Reason for exam:->ventilator care FINDINGS: There has been interval endotracheal and enteric extubation. Cardiac silhouette is normal in size. Dense atherosclerotic calcifications are present of the aortic arch. Central vascular congestion remains unchanged and there are questionable developing upper lobe airspace opacities. Trace pleural effusions and adjacent atelectasis noted bilaterally. Osseous structures and soft tissues are grossly stable     Interval endotracheal and enteric extubation. Pulmonary vascular congestion and developing upper lobe airspace opacities. Stable appearing bilateral pleural effusions and adjacent atelectasis. Xr Chest Portable    Result Date: 1/24/2018  EXAMINATION: SINGLE VIEW OF THE CHEST 1/24/2018 8:35 am COMPARISON: January 23, 2018 HISTORY: ORDERING SYSTEM PROVIDED HISTORY: ventilator care TECHNOLOGIST PROVIDED HISTORY: Reason for exam:->ventilator care FINDINGS: The ETT is 4.5 cm above the glenda. The feeding tube is inserted with its tip below the diaphragm and beyond the field of view. The cardiomediastinal silhouette is stable. There is mild pulmonary vascular congestion. There is mild atelectasis at the bilateral lung bases. There are mild bilateral pleural effusions. No evidence of pneumothorax. The osseous structures are stable. Stable cardiomegaly. Mild pulmonary vascular congestion. Mild bibasilar atelectasis. Mild bilateral pleural effusions.      Xr Chest Portable    Result Date: 1/23/2018  EXAMINATION: SINGLE VIEW OF THE CHEST 1/23/2018 6:46 am COMPARISON: 01/22/2018, 2136 hours HISTORY: ORDERING SYSTEM PROVIDED HISTORY: ventilator care TECHNOLOGIST PROVIDED HISTORY: Reason for exam:->ventilator care 70-year-old intubated female receiving ventilator support/care FINDINGS: Portable supine view of the chest. Endotracheal tube distal tip overlying the mid trachea approximately 6.3 cm above the level of the glenda. Enteric tube traverses the GE junction with distal tip overlying the left upper quadrant, likely within the body of the stomach. Right internal jugular approach sheath or catheter with distal tip projecting over the SVC. Cardiac monitor leads overlie the chest. Atherosclerotic calcification of the aortic knob. Cardiac and mediastinal contours unchanged. No pneumothorax on limited supine imaging. Cardiac size within normal limits. No acute focal airspace consolidation or sizable pleural effusions. Mild bibasilar atelectasis. Visualized osseous structures remain unchanged. 1. Mild bibasilar atelectasis. 2. Tubes and line as detailed above. 3. No acute focal airspace consolidation or pleural effusions. Xr Chest Portable    Result Date: 1/22/2018  EXAMINATION: SINGLE VIEW OF THE CHEST 1/22/2018 9:57 pm COMPARISON: None. HISTORY: ORDERING SYSTEM PROVIDED HISTORY: rales, intubated TECHNOLOGIST PROVIDED HISTORY: Reason for exam:->rales, intubated FINDINGS: There is an endotracheal tube in satisfactory position. The tip of the nasogastric tube is just beyond the GE junction. Heart size is within normal limits and the lungs are clear. No pneumothorax or pleural fluid. No acute cardiopulmonary disease. Endotracheal tube is in satisfactory position. The tip of the nasogastric tube is just beyond the GE junction. The tube should be advanced 10 cm.      Cta Neck W Contrast    Result Date: 1/23/2018  EXAMINATION: CTA OF THE NECK; CTA OF THE HEAD WITH CONTRAST 1/22/2018 11:01 am: TECHNIQUE: CTA of the neck was performed with the administration of intravenous contrast. evidence of good MCA collaterals with visualization of diminutive left M2 and M3 branches on arterial, venous, and delayed phases. Atherosclerotic calcification of the cavernous right internal carotid artery without flow-limiting stenosis. An anterior communicating artery is visualized. Left A1 segment appears hypoplastic. The anterior cerebral and middle cerebral arteries demonstrate no focal stenosis. POSTERIOR CIRCULATION: No flow-limiting stenosis of the intracranial vertebral arteries. No flow-limiting stenosis of the basilar or bilateral posterior cerebral arteries. ANEURYSM: No intracranial aneurysm is seen. BRAIN: No mass effect or midline shift. No abnormal extra-axial fluid collection. Re-demonstration of left basal ganglia and thalamic hypoattenuation, consistent with left MCA infarct. 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 The left internal carotid artery is occluded. Good left MCA collaterals, with visualization of left M2 and M3 branches on arterial, venous, and delayed phases. Mild, less than 50%, stenosis of the right internal carotid artery by NASCET criteria. No additional intracranial flow-limiting stenosis or aneurysm. Hypoattenuation of the left basal ganglia and left thalamus, consistent with acute left MCA infarct. Findings were discussed with Jenny Bain at 11:12 a.m. and 11:26 a.m. on 1/22/2018. Mri Brain Wo Contrast    Result Date: 1/23/2018  EXAMINATION: MRI OF THE BRAIN WITHOUT CONTRAST,  1/23/2018 11:09 am TECHNIQUE: Multiplanar multisequence MRI of the brain was performed without the administration of intravenous contrast. COMPARISON: Head CT 01/23/2018 and 01/22/2018 HISTORY: ORDERING SYSTEM PROVIDED HISTORY: follow up left MCA infarct Acute symptoms. Initial encounter.  FINDINGS: INTRACRANIAL STRUCTURES/VENTRICLES: There is a large area of diffusion restriction involving the left basal ganglia as Inattention:  2 - profound robert-inattention or robert- inattention to more than one modality. Does not recognize own hand or orients only to one side of space   Total 18    DRAINS:  [x] There are no drains for Neuro Critical Care to monitor at this time. ASSESSMENT / PLAN / MEDICAL DECISION MAKIN yo female presented to ED on 18 with left MCA distribution infarct.  TPA administered around 1053.  S/P mechanical thrombectomy of the left CCA, ICA, and ECA as well as the left MCA. NEUROLOGIC:  - L MCA distribution CVA s/p TPA and mechanical thrombectomy  - S/P mechanical thrombectomy of the left CCA, ICA, and ECA as well as the left MCA on   - [facility]  showed evolving left MCA territory infarct, no definite hemorrhagic component  - MRI Brain  showed evolving acute/early subacute infarct within the left MCA territory effecting L basal ganglia, L frontal, temporal, and parietal lobes, minimal petechial hemorrhage in L basal ganglia, acute/early subacute infarct L cerebellum  - Continue Aspirin 150 mg rectally  - CTH tomorrow AM to monitor for hemorrhagic conversion  - Continue Lipitor 40 mg  - Goal -160  - Neuro checks per protocol    CARDIOVASCULAR:  - -160  - Cardene gtt prn, wean off when able  - Unable to start home anti-hypertensives due to failing swallow study. Will repeat swallow today, and restart as able. - PRN labetalol and hydralazine for SBP greater than 160  - History of atrial fibrillation  - Echo  showed EF 55%, mildly dilated left atrium, grade I diastolic dysfunction    PULMONARY:  - Successfully extubated   - Maintaining O2 saturation on 3 liters NC  - Continue to monitor for respiratory distress    RENAL/FLUID/ELECTROLYTE:  - UOP 0.2 mL/kg/hr  - Renal US  Somewhat limited study technically. No pathology demonstrated.   - BUN 18 / Creatinine 0.47  - Keep maldonado for strict I&Os  - Lasix 40 mg IV today; followed by 20 mg BID tomorrow  - Monitor Potassium, [facility]  Neuro Critical Care  Phone [phone]  1/25/2018     7:41 AM

## 2018-01-25 NOTE — PROGRESS NOTES
Physical Therapy  DATE: 2018    NAME: Deirdre Mendoza  MRN: [id number]   : 1940    Patient not seen this date for Physical Therapy due to:  [] Blood transfusion in progress  [] Hemodialysis  []  Patient Declined  [] Spine Precautions   [] Strict Bedrest  [] Surgery/ Procedure  [x] Testing Per Rn, pt leaving with transport for video swallow study. Will check back tomorrow. [] Other        [] PT being discontinued at this time. Patient independent. No further needs. [] PT being discontinued at this time as the patient has been transferred to palliative care. No further needs. Treatment performed by Student PTA under the supervision of co-signing PTA who agrees with all treatment and documentation.    [address] [place] [address]

## 2018-01-25 NOTE — PROGRESS NOTES
Speech Language Pathology  Speech Language Pathology  [facility]    Dysphagia Treatment Note    Date: 1/25/2018  Patients Name: Kathleen Hayes  MRN: [id number]  Diagnosis: dysphagia   Patient Active Problem List   Diagnosis Code    Chronic left shoulder pain M25.512, G89.29    Gastroesophageal reflux disease without esophagitis K21.9    Essential hypertension I10    Controlled type 2 diabetes mellitus without complication, without long-term current use of insulin [facility] E11.9    Insomnia G47.00    Arthritis of shoulder region, left M19.012    Compression fracture of thoracic spine, non-traumatic, initial encounter ([facility] M48.54XA    Acute ischemic left middle cerebral artery (MCA) stroke ([facility]) K95.361    Received intravenous tissue plasminogen activator (t-PA) in emergency department Z92.82    Paroxysmal atrial fibrillation ([facility]) I48.0    Acute respiratory failure with hypoxia ([facility]) J96.01    Common carotid artery thrombosis, 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 Seen today for possible start of diet. Pt. Up in bed, awake. Given trials of puree and nectar thick liquid. Pt. Able to take PO from spoon without difficulty, improvement from yesterday 1/24. Continues with right facial weakness. Unable to use straw. No oral residual noted on this date. Pt. With no s/s of aspiration with all consistencies. However, pt. Continues to be non-verbal, therefore unable to r/o silent aspiration at bedside. ST recommends NPO and modified barium swallow study to determine appropriate diet. Results and recommendations reported to RN. Plan:  [x] Continue ST services    [] Discharge from ST:             Treatment completed by: Yousuf Johnson M.A.  CCC-SLP

## 2018-01-25 NOTE — PLAN OF CARE
Problem: Risk for Impaired Skin Integrity  Goal: Tissue integrity - skin and mucous membranes  Structural intactness and normal physiological function of skin and  mucous membranes.    Outcome: Ongoing      Problem: HEMODYNAMIC STATUS  Goal: Patient has stable vital signs and fluid balance  Outcome: Ongoing

## 2018-01-25 NOTE — CONSULTS
initiate antigravity movements, no effort in RUE. Patient continues to have severe dysarthria and mild expressive if easy a as well. Patient will be having video swallow study today. As per patient's daughter Rubia Ross the patient had a fall in December 2017 which resulted in patient having fracture spine. The patient was to have treatment for her fracture spine but due to the stroke she has not been able to be treated for her fracture spine.     Active Hospital Problems    Diagnosis Date Noted    Received intravenous tissue plasminogen activator (t-PA) in emergency department [Z92.82]     Paroxysmal atrial fibrillation (HCC) [I48.0]     Acute respiratory failure with hypoxia (HCC) [J96.01]     Common carotid artery thrombosis, left [I65.22]     Global aphasia [R47.02]     Acute right hemiparesis (HCC) [G81.91]     Acute ischemic left middle cerebral artery (MCA) stroke ([facility] [I63.512] 01/22/2018       PAST MEDICAL HISTORY      Diagnosis Date    Arthritis     Cancer ([facility]     skin    Diabetes mellitus ([facility]     Hyperlipidemia     Hypertension     Psychiatric problem        PAST SURGICAL HISTORY  Past Surgical History:   Procedure Laterality Date    ARM SURGERY      JOINT REPLACEMENT Bilateral     Knees    OVARY REMOVAL Left        SOCIAL HISTORY  Social History   Substance Use Topics    Smoking status: Never Smoker    Smokeless tobacco: Never Used    Alcohol use No       ALLERGIES  Allergies   Allergen Reactions    Prozac [Fluoxetine Hcl] Other (See Comments)     'puts me in [place]- land'    Xanax [Alprazolam] Anxiety    Wellbutrin [Bupropion]          MEDICATIONS  Current Medications    bisacodyl  10 mg Rectal Daily    [START ON 1/26/2018] esomeprazole  40 mg Intravenous QAM AC    And    [START ON 1/26/2018] sodium chloride (PF)  10 mL Intravenous QAM AC    furosemide  40 mg Intravenous Once    [START ON 1/26/2018] furosemide  20 mg Intravenous BID    venlafaxine  75 mg Per G Tube TID WC without lesions  Neck - supple, no significant adenopathy  Chest - clear to auscultation, no wheezes, rales or rhonchi, symmetric air entry  Heart - normal rate, regular rhythm, normal S1, S2, no murmurs, rubs, clicks or gallops  Abdomen - soft, nontender, nondistended, no masses or organomegaly  Neurological - alert, awake, follows some commands, unable to speak, very slurred speech, unintelligible  Extremities - no movements on the right arm as well as right leg   Skin - normal coloration and turgor, no rashes, no suspicious skin lesions noted      Palliative Performance Scale:  ___60%  Ambulation reduced; Significant disease; Can't do hobbies/housework; intake normal or reduced; occasional assist; LOC full/confusion  ___50%  Mainly sit/lie; Extensive disease; Can't do any work; Considerable assist; intake normal or reduced; LOC full/confusion  ___40%  Mainly in bed; Extensive disease; Mainly assist; intake normal or reduced; LOC full/confusion   ___30%  Bed Bound; Extensive disease; Total care; intake reduced; LOCfull/confusion  ___20%  Bed Bound; Extensive disease; Total care; intake minimal; Drowsy/coma  __X_10%  Bed Bound; Extensive disease;  Total care; Mouth care only; Drowsy/coma  ___0       Death      Plan      Palliative Interaction:    - The patient was seen today and patient's daughter Christopher Snydreer was also present at the patient's bedside    - Christopher Rm wanted to discuss patient's present medical condition with me and not in front of the patient    - Christopher Rm and I had meeting in the waiting room on the 5th floor    - I discussed patient's current medical condition with Juan Alfaro told me that her mother had been very independent and had been living alone and taking care of herself    - Christopher Rm told that she is the only child of her mother and also her mother has named her as her POA and also her mother has the living will    - I asked Christopher Rm what her mother - the patient's wishes were, Christopher Rm stated that her mother had always told her that she never wanted to be on life support , she never wanted to live a life dependent upon others and not to be able to take care of herself     - I discussed different types of code status with Nasrin Brush and she told that her mother never wanted to be intubated and also she did not want to be resuscitated in case her heart stopped    - Nasrin Brush also told that her mother has fracture spine that has not been taken care of due to the stroke    - Nasrin Brush said that her mother is having the video swallow study done today    - Nasrin Brush also said that her mother never wanted to live on feeding tubes and I told her to wait and see what the results of the swallow study would be    - Nasrin Brush was very tearful and emotional and kept on saying that she wants to do what her mother's wishes were    - I offered great comfort and empathy and support to Nasrin Brush    - I along with patient's nurse again explained the code status to [place] [facility]) said that she wanted the code status to be changed from full code to DNR CCA with no intubation     - I changed the code status to DNR CCA with no intubation    Education/support to staff  Education/support to family  Communications with primary service  Caregiver support/education     Principle Problem/Diagnosis:  CVA     Additional assessments  Extubated  L MCA distribution CVA s/p TPA and mechanical thrombectomy  S/P mechanical thrombectomy of the left CCA, ICA and ECA as well as the left MCA on 1/22  Dysarthria   Right sided weakness  Hypertension  Fracture spine  Diabetes mellitus type II    1- Symptom management/ pain control     Pain Assessment:  Pain is controlled with current analgesics. Medication(s) being used: narcotic analgesics including fentanyl IV. Anxiety:  none                          Dyspnea:  none                          Fatigue:  none    Other: We feel the patient symptoms are being controlled.  her current regimen is reviewed by myself and discussed with the staff. 2- Goals of care evaluation   The patient goals of care are improve or maintain function/quality of life, provide comfort care/support/palliation/relieve suffering, spiritual needs, strengthening relationships and support for family/caregiver   Goals of care discussed with:    [] Patient independently    [] Patient and Family    [x] Family or Healthcare DPOA independently    [] Unable to discuss with patient, family/DPOA not present    3- Code Status  DNR-CCA  with no intubation    4- Other recommendations  - We will continue to follow-up with further goals of care   - We will provide comfort and support to the patient and the family   Please call with any palliative questions or concerns. Palliative Care Team is available via perfect serve or via phone. Palliative Care will continue to follow Ms. Jimenez's care as needed. The total time spent in seeing the patient and face to face meeting was 60 minutes. The note has been dictated by dragon, typing errors may be a possibility. Thank you for allowing Palliative Care to participate in the care of Ms. Jimenez .     Electronically signed by   Danielle Sen MD  Palliative Care Team  on 1/25/2018 at 1:52 PM    Palliative care office: [phone]

## 2018-01-26 ENCOUNTER — APPOINTMENT (OUTPATIENT)
Dept: GENERAL RADIOLOGY | Age: 78
DRG: 023 | End: 2018-01-26
Payer: MEDICARE

## 2018-01-26 ENCOUNTER — APPOINTMENT (OUTPATIENT)
Dept: CT IMAGING | Age: 78
DRG: 023 | End: 2018-01-26
Payer: MEDICARE

## 2018-01-26 VITALS
HEIGHT: 65 IN | BODY MASS INDEX: 31.49 KG/M2 | HEART RATE: 106 BPM | OXYGEN SATURATION: 98 % | RESPIRATION RATE: 22 BRPM | TEMPERATURE: 97.3 F | SYSTOLIC BLOOD PRESSURE: 177 MMHG | WEIGHT: 189 LBS | DIASTOLIC BLOOD PRESSURE: 81 MMHG

## 2018-01-26 LAB
ABSOLUTE EOS #: 0 K/UL (ref 0–0.4)
ABSOLUTE IMMATURE GRANULOCYTE: 0 K/UL (ref 0–0.3)
ABSOLUTE LYMPH #: 0.65 K/UL (ref 1–4.8)
ABSOLUTE MONO #: 1.42 K/UL (ref 0.1–0.8)
ANION GAP SERPL CALCULATED.3IONS-SCNC: 13 MMOL/L (ref 9–17)
BASOPHILS # BLD: 0 % (ref 0–2)
BASOPHILS ABSOLUTE: 0 K/UL (ref 0–0.2)
BUN BLDV-MCNC: 14 MG/DL (ref 8–23)
BUN/CREAT BLD: ABNORMAL (ref 9–20)
CALCIUM IONIZED: 1.18 MMOL/L (ref 1.13–1.33)
CALCIUM SERPL-MCNC: 8.6 MG/DL (ref 8.6–10.4)
CHLORIDE BLD-SCNC: 112 MMOL/L (ref 98–107)
CO2: 22 MMOL/L (ref 20–31)
CREAT SERPL-MCNC: 0.42 MG/DL (ref 0.5–0.9)
DIFFERENTIAL TYPE: ABNORMAL
EOSINOPHILS RELATIVE PERCENT: 0 % (ref 1–4)
GFR AFRICAN AMERICAN: >60 ML/MIN
GFR NON-AFRICAN AMERICAN: >60 ML/MIN
GFR SERPL CREATININE-BSD FRML MDRD: ABNORMAL ML/MIN/{1.73_M2}
GFR SERPL CREATININE-BSD FRML MDRD: ABNORMAL ML/MIN/{1.73_M2}
GLUCOSE BLD-MCNC: 110 MG/DL (ref 70–99)
GLUCOSE BLD-MCNC: 115 MG/DL (ref 65–105)
GLUCOSE BLD-MCNC: 97 MG/DL (ref 65–105)
HCT VFR BLD CALC: 30.4 % (ref 36.3–47.1)
HEMOGLOBIN: 9.4 G/DL (ref 11.9–15.1)
IMMATURE GRANULOCYTES: 0 %
LYMPHOCYTES # BLD: 5 % (ref 24–44)
MAGNESIUM: 1.8 MG/DL (ref 1.6–2.6)
MCH RBC QN AUTO: 34.9 PG (ref 25.2–33.5)
MCHC RBC AUTO-ENTMCNC: 30.9 G/DL (ref 28.4–34.8)
MCV RBC AUTO: 113 FL (ref 82.6–102.9)
MONOCYTES # BLD: 11 % (ref 1–7)
MORPHOLOGY: ABNORMAL
NRBC AUTOMATED: 0 PER 100 WBC
PDW BLD-RTO: ABNORMAL % (ref 11.8–14.4)
PHOSPHORUS: 2.2 MG/DL (ref 2.6–4.5)
PLATELET # BLD: 419 K/UL (ref 138–453)
PLATELET ESTIMATE: ABNORMAL
PMV BLD AUTO: 9.3 FL (ref 8.1–13.5)
POTASSIUM SERPL-SCNC: 3.7 MMOL/L (ref 3.7–5.3)
RBC # BLD: 2.69 M/UL (ref 3.95–5.11)
RBC # BLD: ABNORMAL 10*6/UL
SEG NEUTROPHILS: 84 % (ref 36–66)
SEGMENTED NEUTROPHILS ABSOLUTE COUNT: 10.83 K/UL (ref 1.8–7.7)
SODIUM BLD-SCNC: 147 MMOL/L (ref 135–144)
WBC # BLD: 12.9 K/UL (ref 3.5–11.3)
WBC # BLD: ABNORMAL 10*3/UL

## 2018-01-26 PROCEDURE — 83735 ASSAY OF MAGNESIUM: CPT

## 2018-01-26 PROCEDURE — 2500000003 HC RX 250 WO HCPCS: Performed by: PSYCHIATRY & NEUROLOGY

## 2018-01-26 PROCEDURE — 2580000003 HC RX 258: Performed by: NURSE PRACTITIONER

## 2018-01-26 PROCEDURE — 6360000002 HC RX W HCPCS: Performed by: NURSE PRACTITIONER

## 2018-01-26 PROCEDURE — 94762 N-INVAS EAR/PLS OXIMTRY CONT: CPT

## 2018-01-26 PROCEDURE — 2580000003 HC RX 258: Performed by: PSYCHIATRY & NEUROLOGY

## 2018-01-26 PROCEDURE — 70450 CT HEAD/BRAIN W/O DYE: CPT

## 2018-01-26 PROCEDURE — 84100 ASSAY OF PHOSPHORUS: CPT

## 2018-01-26 PROCEDURE — 99497 ADVNCD CARE PLAN 30 MIN: CPT | Performed by: FAMILY MEDICINE

## 2018-01-26 PROCEDURE — 99239 HOSP IP/OBS DSCHRG MGMT >30: CPT | Performed by: PSYCHIATRY & NEUROLOGY

## 2018-01-26 PROCEDURE — 85025 COMPLETE CBC W/AUTO DIFF WBC: CPT

## 2018-01-26 PROCEDURE — 92507 TX SP LANG VOICE COMM INDIV: CPT

## 2018-01-26 PROCEDURE — 6360000002 HC RX W HCPCS: Performed by: PSYCHIATRY & NEUROLOGY

## 2018-01-26 PROCEDURE — 82330 ASSAY OF CALCIUM: CPT

## 2018-01-26 PROCEDURE — 36415 COLL VENOUS BLD VENIPUNCTURE: CPT

## 2018-01-26 PROCEDURE — 6360000002 HC RX W HCPCS

## 2018-01-26 PROCEDURE — 82947 ASSAY GLUCOSE BLOOD QUANT: CPT

## 2018-01-26 PROCEDURE — 80048 BASIC METABOLIC PNL TOTAL CA: CPT

## 2018-01-26 PROCEDURE — 99232 SBSQ HOSP IP/OBS MODERATE 35: CPT | Performed by: PSYCHIATRY & NEUROLOGY

## 2018-01-26 PROCEDURE — 6370000000 HC RX 637 (ALT 250 FOR IP): Performed by: EMERGENCY MEDICINE

## 2018-01-26 PROCEDURE — 6370000000 HC RX 637 (ALT 250 FOR IP): Performed by: NURSE PRACTITIONER

## 2018-01-26 RX ORDER — LORAZEPAM 2 MG/ML
0.5 INJECTION INTRAMUSCULAR
Status: DISCONTINUED | OUTPATIENT
Start: 2018-01-26 | End: 2018-01-26 | Stop reason: HOSPADM

## 2018-01-26 RX ORDER — LORAZEPAM 2 MG/ML
INJECTION INTRAMUSCULAR
Status: COMPLETED
Start: 2018-01-26 | End: 2018-01-26

## 2018-01-26 RX ADMIN — FENTANYL CITRATE 25 MCG: 50 INJECTION, SOLUTION INTRAMUSCULAR; INTRAVENOUS at 18:19

## 2018-01-26 RX ADMIN — LORAZEPAM 0.5 MG: 2 INJECTION INTRAMUSCULAR; INTRAVENOUS at 14:06

## 2018-01-26 RX ADMIN — Medication 10 ML: at 08:40

## 2018-01-26 RX ADMIN — ASPIRIN 150 MG: 300 SUPPOSITORY RECTAL at 00:36

## 2018-01-26 RX ADMIN — FENTANYL CITRATE 25 MCG: 50 INJECTION, SOLUTION INTRAMUSCULAR; INTRAVENOUS at 14:22

## 2018-01-26 RX ADMIN — LORAZEPAM 0.5 MG: 2 INJECTION INTRAMUSCULAR; INTRAVENOUS at 18:19

## 2018-01-26 RX ADMIN — LABETALOL HYDROCHLORIDE 10 MG: 5 INJECTION, SOLUTION INTRAVENOUS at 05:41

## 2018-01-26 RX ADMIN — Medication 10 ML: at 06:24

## 2018-01-26 RX ADMIN — FENTANYL CITRATE 25 MCG: 50 INJECTION, SOLUTION INTRAMUSCULAR; INTRAVENOUS at 11:37

## 2018-01-26 RX ADMIN — ESOMEPRAZOLE SODIUM 40 MG: 40 INJECTION INTRAVENOUS at 06:19

## 2018-01-26 RX ADMIN — BISACODYL 10 MG: 10 SUPPOSITORY RECTAL at 08:38

## 2018-01-26 RX ADMIN — HEPARIN SODIUM 5000 UNITS: 5000 INJECTION, SOLUTION INTRAVENOUS; SUBCUTANEOUS at 06:19

## 2018-01-26 RX ADMIN — FUROSEMIDE 20 MG: 10 INJECTION, SOLUTION INTRAMUSCULAR; INTRAVENOUS at 08:38

## 2018-01-26 NOTE — CARE COORDINATION
Pt's family made decision for [facility] inpatient services. Candace from [facility] making arrangements. Writer set up transportation to Hospice for 7:30 pm.  Left message at [facility] admissions to inform them pt going to hospice facility. Call to [facility] w/estimated arrival time.

## 2018-01-26 NOTE — DISCHARGE SUMMARY
K  4.0  3.1*  3.7   CL  110*  109*  112*   CO2  22  22  22   BUN  18  18  14   CREATININE  0.53  0.47*  0.42*       Ct Head Wo Contrast    Result Date: 1/26/2018  EXAMINATION: CT OF THE HEAD WITHOUT CONTRAST  1/26/2018 4:25 am TECHNIQUE: CT of the head was performed without the administration of intravenous contrast. Dose modulation, iterative reconstruction, and/or weight based adjustment of the mA/kV was utilized to reduce the radiation dose to as low as reasonably achievable. COMPARISON: MRI brain January 23, 2018, CT head January 23, 2018, January 22, 2018, HISTORY: ORDERING SYSTEM PROVIDED HISTORY: follow up, CVA TECHNOLOGIST PROVIDED HISTORY: Has a \"code stroke\" or \"stroke alert\" been called? ->No FINDINGS: BRAIN/VENTRICLES:  Continued evolution of left MCA acute infarct with patchy loss of gray-white differentiation within the territory. The left cerebellar hemisphere acute infarct noted on prior MRI examination is indistinct. Left basal ganglia petechial hemorrhage continues to degrade. No acute intracranial hemorrhage. Regional mass effect with partial compression of the left lateral ventricle without hydrocephalus and minimal 1-2 mm of rightward midline shift. No mass or new acute infarct. Vascular calcifications are present. ORBITS: The visualized portion of the orbits demonstrate no acute abnormality. SINUSES: The visualized paranasal sinuses and mastoid air cells demonstrate no acute abnormality. SOFT TISSUES/SKULL:  No acute abnormality of the visualized skull or soft tissues. Continued evolution of left MCA territory acute to subacute infarct. Rightward midline shift of 1-2 mm is stable. Continued degradation of left basal ganglia petechial hemorrhage. No hydrocephalus. No detrimental interval change. Left cerebellar hemisphere acute infarct noted on prior MRI examination is indistinct.      Ct Head Wo Contrast    Result Date: 1/23/2018  EXAMINATION: CT OF THE HEAD WITHOUT CONTRAST limited to vessel injury or perforation, stroke, intracranial hemorrhage, radiation, dye allergic reaction, groin hematoma and death, an informed consent was obtained, signed and witnessed. Technique and Interpretations: The patient was brought to the interventional suite and placed in a supine position by the stroke and anesthesia team. The patient prepped and draped under sterile technique and the right CFA was accessed under FL guidance using an 8F introducer sheath. Left CCA Technique: The Left common carotid artery (CCA) was selectively catheterized under fluoroscopic guidance and over the guide wire using the 8F FlowGate balloon guide catheter and ACE 68 reperfusion catheter. Images were obtained in standard biplane projections. Left CCA Interpretation: Extensive clots occluding the distal CCA and extending into the ICA as well as the ECA. Vacuum pump reperfusion of the left CCA, ICA and ECA: Pass 1: The tip of the ACE 68 catheter was advanced to face the clots in the CCA and negative suction using the the vacuum pump from the ACE 0.68 reperfusion catheter as well as manual suction using a 60 cc syringe from the flow gate balloon guided catheter were  applied. The FlowGate balloon guide catheter was pulled out with the ACE 68 catheter and were double flushed. Post first pass run demonstrated recanalization of the ECA with persistent clots occluding the left ICA. Pass 2: The left CCA was selectively catheterized again using the balloon guided catheter and ACE 68. The tip of the ACE 68 catheter was advanced to the proximal ICA. The balloon guide catheter was inflated in the ICA and flow arrest was obtained. Same suctioning technique described above was repeated. Post second pass run demonstrated complete flow restoration of the CCA, ICA and ECA.  Left ICA Technique: Subsequently, the internal carotid artery (ICA) was selectively catheterized under fluoroscopic guidance and over the guide wire using the 8F superior division M2 MCA was gently crossed using the Synchro 2 wire and Marksman microcatheter. The micro-wire was removed and the micro-catheter was flushed slowly. Super Selective L M2 MCA Micro-catheter Angiogram Interpretation: the distal MCA branches to the clot, appeared to be patent, with slow flow. The Solitaire 4 x 40 mm device was advanced under fluoroscopic guidance and was unsheathed spanning the L M2 MCA clot. The Device was kept open for 5 minutes. Subsequently, the balloon guide catheter was inflated in the ICA and flow arrest was obtained, the device was then pulled with CAT6 into the balloon guide catheter slowly with manual negative pressure from the balloon side port using a 60 cc syringe and vacuum pump suctioning from the CAT6. The guide catheter was double flushed and the balloon was deflated. Post third pass run demonstrated TICI2c recanalization with both superior and inferior division M2 MCA recanalization and slow flow in few distal cortical vessels. Final Left CCA injection: demonstrated  normal carotid bifurcation with occlusion of the left internal maxillary artery. The left external carotid artery was selectively catheterized under fluoroscopic guidance and images were obtained in anterior and lateral projections of the left extracranial circulation. Left ECA injection: demonstrated  occlusion of the left internal maxillary artery with TICI 2c recanalization of the left ECA. The right common femoral artery was then evaluated via femoral angiogram via the introducer sheath for possible use of closure device. The final images were reviewed, and the introducer sheath was removed. Hemostasis was maintained using 8F Angioseal closing device followed by manual pressure for 15 minutes. Impression: 1. 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  2. TICI 2C flow restoration of the left CCA,ICA and ECA with 2 passes of vacuum pump reperfusion from ACE 0.68 and +----------------------------------+----------+---------------+------------+ ! Basilic at UA                     ! Yes       ! No             !Homogenous  ! +----------------------------------+----------+---------------+------------+ ! Basilic at AF                     ! Yes       ! No             !Homogenous  ! +----------------------------------+----------+---------------+------------+ ! Basilic at [address]                     ! Yes       ! Yes            ! None        ! +----------------------------------+----------+---------------+------------+ ! Cephalic at UA                    ! Yes       ! Yes            ! None        ! +----------------------------------+----------+---------------+------------+ ! Cephalic at AF                    ! Yes       ! No             !Heterogenous! +----------------------------------+----------+---------------+------------+ ! Cephalic at [address]                    ! Yes       ! No             !Heterogenous! +----------------------------------+----------+---------------+------------+ Doppler Measurements +------------------------+-------------------------+-----------------------+ ! Location                ! Signal                   !Reflux                 ! +------------------------+-------------------------+-----------------------+ ! IJV                     ! Pulsatile                !                       ! +------------------------+-------------------------+-----------------------+ ! SCV                     ! Phasic                   !                       ! +------------------------+-------------------------+-----------------------+ ! Axillary                ! Phasic                   !                       ! +------------------------+-------------------------+-----------------------+ ! Brachial                !Phasic                   !                       ! +------------------------+-------------------------+-----------------------+ Left UE Vein Measurements 2D Measurements phases. Atherosclerotic calcification of the cavernous right internal carotid artery without flow-limiting stenosis. An anterior communicating artery is visualized. Left A1 segment appears hypoplastic. The anterior cerebral and middle cerebral arteries demonstrate no focal stenosis. POSTERIOR CIRCULATION: No flow-limiting stenosis of the intracranial vertebral arteries. No flow-limiting stenosis of the basilar or bilateral posterior cerebral arteries. ANEURYSM: No intracranial aneurysm is seen. BRAIN: No mass effect or midline shift. No abnormal extra-axial fluid collection. Re-demonstration of left basal ganglia and thalamic hypoattenuation, consistent with left MCA infarct. 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 The left internal carotid artery is occluded. Good left MCA collaterals, with visualization of left M2 and M3 branches on arterial, venous, and delayed phases. Mild, less than 50%, stenosis of the right internal carotid artery by NASCET criteria. No additional intracranial flow-limiting stenosis or aneurysm. Hypoattenuation of the left basal ganglia and left thalamus, consistent with acute left MCA infarct. Findings were discussed with Raheem Cleary at 11:12 a.m. and 11:26 a.m. on 1/22/2018. Us Retroperitoneal Complete    Result Date: 1/24/2018  EXAMINATION: RETROPERITONEAL ULTRASOUND OF THE KIDNEYS AND URINARY BLADDER 1/24/2018 COMPARISON: None HISTORY: ORDERING SYSTEM PROVIDED HISTORY: Decreased urine output. History of diabetes, hypertension and skin cancer. FINDINGS: Kidneys: The right kidney measures 10.7 x 4.9 x 4.9 cm and the left kidney measures 10.4 x 4.9 x 4.7 cm. Assessment of the left kidney was somewhat limited technically. Cortical thickness is 1.5 cm on the right and 1.1 cm on the left. Kidneys demonstrate unremarkable cortical echogenicity. No evidence of hydronephrosis or intrarenal stones.  Bladder: Unremarkable appearance of the bladder with a Grullon catheter in place. No post void residual could be measured. Somewhat limited study technically. No pathology demonstrated. Grullon catheter in the urinary bladder. Cta Head W Contrast    Result Date: 1/23/2018  EXAMINATION: CTA OF THE NECK; CTA OF THE HEAD WITH CONTRAST 1/22/2018 11:01 am: TECHNIQUE: CTA of the neck was performed with the administration of intravenous contrast. Multiplanar reformatted images are provided for review. MIP images are provided for review. Stenosis of the internal carotid arteries measured using NASCET criteria. Dose modulation, iterative reconstruction, and/or weight based adjustment of the mA/kV was utilized to reduce the radiation dose to as low as reasonably achievable.; CTA of the head/brain was performed with the administration of intravenous contrast. Multiplanar reformatted images are provided for review. MIP images are provided for review. Dose modulation, iterative reconstruction, and/or weight based adjustment of the mA/kV was utilized to reduce the radiation dose to as low as reasonably achievable. 3D reconstructed images were performed on a separate workstation and provided for review. COMPARISON: Noncontrast CT head performed earlier the same day. HISTORY: ORDERING SYSTEM PROVIDED HISTORY: concern for stroke FINDINGS: CTA NECK: AORTIC ARCH/ARCH VESSELS: There is a 4 vessel aortic arch with separate arch origin of the left vertebral artery. Atherosclerotic calcification of the aortic arch and proximal arch vessel. No significant stenosis is seen of the innominate artery or subclavian arteries. CAROTID ARTERIES: There is occlusion of the left common carotid artery approximately 1 cm distal to its origin. At the origin of the common carotid artery, there is a curvilinear intraluminal filling defect, suggestive of a dissection flap. The left internal carotid artery is occluded.  There is no flow-limiting stenosis MG tablet  Commonly known as:  LIPITOR     cyanocobalamin 1000 MCG/ML injection     ferrous sulfate 325 (65 Fe) MG EC tablet     hydrochlorothiazide 25 MG tablet  Commonly known as:  HYDRODIURIL     hydroxyurea 500 MG chemo capsule  Commonly known as:  HYDREA     lidocaine 5 %  Commonly known as:  LIDODERM     magnesium hydroxide 400 MG/5ML suspension  Commonly known as:  MILK OF MAGNESIA     metoprolol succinate 25 MG extended release tablet  Commonly known as:  TOPROL XL     omeprazole 20 MG delayed release capsule  Commonly known as:  PRILOSEC     OXcarbazepine 300 MG tablet  Commonly known as:  TRILEPTAL     oxybutynin 5 MG extended release tablet  Commonly known as:  DITROPAN-XL     pioglitazone 15 MG tablet  Commonly known as:  ACTOS     QUEtiapine 300 MG tablet  Commonly known as:  SEROQUEL     valsartan 160 MG tablet  Commonly known as:  DIOVAN     venlafaxine 225 MG extended release tablet          Diet: Failed video swallow  Discharged to Hospice for comfort care measures.   Activity: Provided in AVS  Wound Care: Daily and as needed  Time Spent for discharge: 30 minutes    Kem Angel CNP  1/26/2018, 4:08 PM

## 2018-01-26 NOTE — PROGRESS NOTES
Ct.    1/24: Patient was successfully extubated. She was given 20 mg IVP lasix for low urine output, which she initially responded well to with 300 mL/3 hrs, then returned to around 15 mL/hr. No further coffee ground residuals. 1/25: Patient failed video swallow study. Daughter, POA, does not want an NG placed as she knows this would be against the patient's wishes. Palliative consulted for family support, patient's code status changed to [facility]    Currently, on exam she is awake with a left-upward gaze. She is not following commands but continues to move the left upper and lower extremity spontaneously. Per nursing staff, patient has not slept. Due to failing her swallow study yesterday and family choosing not to place NG, patient was not received any of her home medications. Family meeting with patient's daughter Wenona Phalen) and two grandchildren, [place] team, and Dr. Lorenzo Bhatt with Palliative care. Discussed goals of care, family and patient's wishes. Patient's daughter who is POA, decided they would prefer to switch to comfort care and move patient to a Hospice facility, as she reports the patient who never want to be dependent in a nursing facility. All questions were answered, family verbalizes understanding of new plan of care and switching to comfort measures. No acute events overnight.     CURRENT MEDICATIONS:  SCHEDULED MEDICATIONS:   bisacodyl  10 mg Rectal Daily    esomeprazole  40 mg Intravenous QAM AC    And    sodium chloride (PF)  10 mL Intravenous QAM AC    furosemide  20 mg Intravenous BID    potassium chloride  40 mEq Intravenous Once    venlafaxine  75 mg Per G Tube TID WC    aspirin  81 mg Oral Daily    valsartan  160 mg Per G Tube Daily    OXcarbazepine  300 mg Per G Tube BID    QUEtiapine  100 mg Per G Tube Nightly    heparin (porcine)  5,000 Units Subcutaneous 3 times per day    ferrous sulfate  300 mg Per G Tube TID    hydroxyurea  500 mg Oral TID    sodium chloride  250 mL care TECHNOLOGIST PROVIDED HISTORY: Reason for exam:->ventilator care 59-year-old intubated female receiving ventilator support/care FINDINGS: Portable supine view of the chest. Endotracheal tube distal tip overlying the mid trachea approximately 6.3 cm above the level of the glenda. Enteric tube traverses the GE junction with distal tip overlying the left upper quadrant, likely within the body of the stomach. Right internal jugular approach sheath or catheter with distal tip projecting over the SVC. Cardiac monitor leads overlie the chest. Atherosclerotic calcification of the aortic knob. Cardiac and mediastinal contours unchanged. No pneumothorax on limited supine imaging. Cardiac size within normal limits. No acute focal airspace consolidation or sizable pleural effusions. Mild bibasilar atelectasis. Visualized osseous structures remain unchanged. 1. Mild bibasilar atelectasis. 2. Tubes and line as detailed above. 3. No acute focal airspace consolidation or pleural effusions. Xr Chest Portable    Result Date: 1/22/2018  EXAMINATION: SINGLE VIEW OF THE CHEST 1/22/2018 9:57 pm COMPARISON: None. HISTORY: ORDERING SYSTEM PROVIDED HISTORY: rales, intubated TECHNOLOGIST PROVIDED HISTORY: Reason for exam:->rales, intubated FINDINGS: There is an endotracheal tube in satisfactory position. The tip of the nasogastric tube is just beyond the GE junction. Heart size is within normal limits and the lungs are clear. No pneumothorax or pleural fluid. No acute cardiopulmonary disease. Endotracheal tube is in satisfactory position. The tip of the nasogastric tube is just beyond the GE junction. The tube should be advanced 10 cm. Cta Neck W Contrast    Result Date: 1/23/2018  EXAMINATION: CTA OF THE NECK; CTA OF THE HEAD WITH CONTRAST 1/22/2018 11:01 am: TECHNIQUE: CTA of the neck was performed with the administration of intravenous contrast. Multiplanar reformatted images are provided for review.   MIP T2/FLAIR hyperintense signal. There is mild  cytotoxic edema in the left basal ganglia which partially effaces the left lateral ventricle. There is only minimal 3 mm left-to-right shift of midline structures. There is minimal petechial hemorrhage in the left basal ganglia on the T2 GRE sequence. There is a smaller infarction in the left occipital lobe. No mass effect or midline shift. No evidence of an acute intracranial hemorrhage. Diffuse parenchymal volume loss with enlargement of the ventricles and cerebral sulci. Foci of periventricular and subcortical white matter as well as patchy pontine white matter T2/FLAIR hyperintense signal.  The sellar/suprasellar regions appear unremarkable. The normal signal voids within the major intracranial vessels appear maintained. ORBITS: The visualized portion of the orbits demonstrate no acute abnormality. SINUSES: The visualized paranasal sinuses and mastoid air cells are well aerated. BONES/SOFT TISSUES: The bone marrow signal intensity appears normal. The soft tissues demonstrate no acute abnormality. 1. Evolving acute/early subacute infarction within the left MCA territory effecting the left basal ganglia, left frontal, temporal, and parietal lobes. There is minimal petechial hemorrhage in the left basal ganglia. 2. Smaller acute/early subacute infarction in the left cerebellum. 3. Normal flow voids within the visualized left internal carotid artery and left MCA consistent with patency. 4. Parenchymal volume loss and sequela of chronic microvascular ischemic changes. The findings were sent to the Radiology Results [id number] at 11:31 am on 1/23/2018 to be communicated to a licensed caregiver. PHYSICAL EXAM:  CONSTITUTIONAL:  Well developed, well nourished, awake and alert, in no acute distress. Nontoxic. HEAD:  normocephalic, atraumatic    EYES:  Pupils round and reactive. Right pupil 5 mm, brisk. Left pupil 3 mm sluggish.  Left upward gaze

## 2018-01-26 NOTE — PLAN OF CARE
Problem: Falls - Risk of  Goal: Absence of falls  Outcome: Ongoing  Patient remains free from falls this shift. Problem: Risk for Impaired Skin Integrity  Goal: Tissue integrity - skin and mucous membranes  Structural intactness and normal physiological function of skin and  mucous membranes. Outcome: Ongoing  No new skin breakdown noted. Patient repositioned q2h. Will continue to monitor.
